# Patient Record
Sex: FEMALE | Race: WHITE | NOT HISPANIC OR LATINO | Employment: OTHER | URBAN - METROPOLITAN AREA
[De-identification: names, ages, dates, MRNs, and addresses within clinical notes are randomized per-mention and may not be internally consistent; named-entity substitution may affect disease eponyms.]

---

## 2017-10-04 DIAGNOSIS — E78.5 HYPERLIPIDEMIA, UNSPECIFIED HYPERLIPIDEMIA TYPE: ICD-10-CM

## 2017-10-04 DIAGNOSIS — E03.9 ADULT HYPOTHYROIDISM: ICD-10-CM

## 2017-10-04 DIAGNOSIS — I10 ESSENTIAL HYPERTENSION: ICD-10-CM

## 2017-10-04 RX ORDER — VALSARTAN AND HYDROCHLOROTHIAZIDE 160; 12.5 MG/1; MG/1
TABLET, FILM COATED ORAL
Qty: 30 TABLET | Refills: 0 | Status: SHIPPED | OUTPATIENT
Start: 2017-10-04 | End: 2017-10-30 | Stop reason: SDUPTHER

## 2017-10-04 RX ORDER — LEVOTHYROXINE SODIUM 88 UG/1
TABLET ORAL
Qty: 30 TABLET | Refills: 0 | Status: SHIPPED | OUTPATIENT
Start: 2017-10-04 | End: 2017-11-02 | Stop reason: SDUPTHER

## 2017-10-04 RX ORDER — ATORVASTATIN CALCIUM 20 MG/1
TABLET, FILM COATED ORAL
Qty: 30 TABLET | Refills: 0 | Status: SHIPPED | OUTPATIENT
Start: 2017-10-04 | End: 2017-11-02 | Stop reason: SDUPTHER

## 2017-10-30 DIAGNOSIS — I10 ESSENTIAL HYPERTENSION: ICD-10-CM

## 2017-10-30 RX ORDER — VALSARTAN AND HYDROCHLOROTHIAZIDE 160; 12.5 MG/1; MG/1
TABLET, FILM COATED ORAL
Qty: 30 TABLET | Refills: 3 | Status: SHIPPED | OUTPATIENT
Start: 2017-10-30 | End: 2017-11-02 | Stop reason: ALTCHOICE

## 2017-11-02 ENCOUNTER — OFFICE VISIT (OUTPATIENT)
Dept: FAMILY MEDICINE CLINIC | Facility: CLINIC | Age: 67
End: 2017-11-02

## 2017-11-02 VITALS
SYSTOLIC BLOOD PRESSURE: 128 MMHG | DIASTOLIC BLOOD PRESSURE: 66 MMHG | HEIGHT: 63 IN | BODY MASS INDEX: 35.08 KG/M2 | OXYGEN SATURATION: 97 % | WEIGHT: 198 LBS | RESPIRATION RATE: 14 BRPM | TEMPERATURE: 98.3 F | HEART RATE: 72 BPM

## 2017-11-02 DIAGNOSIS — E03.9 ADULT HYPOTHYROIDISM: ICD-10-CM

## 2017-11-02 DIAGNOSIS — Z12.39 BREAST CANCER SCREENING: ICD-10-CM

## 2017-11-02 DIAGNOSIS — E78.5 HYPERLIPIDEMIA, UNSPECIFIED HYPERLIPIDEMIA TYPE: Primary | ICD-10-CM

## 2017-11-02 DIAGNOSIS — Z91.89 AT RISK FOR DENTAL PROBLEMS: ICD-10-CM

## 2017-11-02 DIAGNOSIS — I10 ESSENTIAL HYPERTENSION: ICD-10-CM

## 2017-11-02 PROCEDURE — 99214 OFFICE O/P EST MOD 30 MIN: CPT | Performed by: NURSE PRACTITIONER

## 2017-11-02 RX ORDER — CLINDAMYCIN HYDROCHLORIDE 300 MG/1
300 CAPSULE ORAL 3 TIMES DAILY
COMMUNITY
End: 2017-11-02 | Stop reason: SDUPTHER

## 2017-11-02 RX ORDER — LEVOTHYROXINE SODIUM 88 UG/1
88 TABLET ORAL DAILY
Qty: 90 TABLET | Refills: 3 | Status: SHIPPED | OUTPATIENT
Start: 2017-11-02 | End: 2018-10-22 | Stop reason: SDUPTHER

## 2017-11-02 RX ORDER — VALSARTAN 160 MG/1
160 TABLET ORAL DAILY
Qty: 90 TABLET | Refills: 3 | Status: SHIPPED | OUTPATIENT
Start: 2017-11-02 | End: 2018-07-26 | Stop reason: SINTOL

## 2017-11-02 RX ORDER — ATORVASTATIN CALCIUM 20 MG/1
20 TABLET, FILM COATED ORAL DAILY
Qty: 90 TABLET | Refills: 3 | Status: SHIPPED | OUTPATIENT
Start: 2017-11-02 | End: 2018-10-22 | Stop reason: SDUPTHER

## 2017-11-02 RX ORDER — CLINDAMYCIN HYDROCHLORIDE 300 MG/1
300 CAPSULE ORAL 3 TIMES DAILY
Qty: 30 CAPSULE | Refills: 0 | Status: SHIPPED | OUTPATIENT
Start: 2017-11-02 | End: 2022-01-24 | Stop reason: SDUPTHER

## 2017-11-02 NOTE — PROGRESS NOTES
"Subjective   Jessi Ruby is a 67 y.o. female.     History of Present Illness   Jessi Ruyb 67 y.o. female who presents today for routine follow up check and medication refills.  she has a history of   Patient Active Problem List   Diagnosis   • Hyperlipidemia   • Hypertension   • Adult hypothyroidism   • Cannot sleep   • At risk for dental problems   • Breast cancer screening   .  Since the last visit, she has overall felt well.  She has Hypertenision and is well controlled on medication, Hyperlipidemia and is well controlled on medication and hypothyroidism and will need to update labs for continued treatment.  she has been compliant with current medications have reviewed them.  The patient denies medication side effects.      The following portions of the patient's history were reviewed and updated as appropriate: allergies, current medications, past social history and problem list.    Review of Systems   Constitutional: Negative for activity change.   All other systems reviewed and are negative.      Objective   /66 (BP Location: Right arm, Patient Position: Sitting, Cuff Size: Adult)  Pulse 72  Temp 98.3 °F (36.8 °C) (Oral)   Resp 14  Ht 63\" (160 cm)  Wt 198 lb (89.8 kg)  SpO2 97%  BMI 35.07 kg/m2  Physical Exam   Constitutional: She is oriented to person, place, and time. Vital signs are normal. She appears well-developed and well-nourished. No distress.   HENT:   Head: Normocephalic.   Cardiovascular: Normal rate, regular rhythm and normal heart sounds.    Pulmonary/Chest: Effort normal and breath sounds normal.   Neurological: She is alert and oriented to person, place, and time. Gait normal.   Psychiatric: She has a normal mood and affect. Her behavior is normal. Judgment and thought content normal.   Vitals reviewed.      Assessment/Plan   Problem List Items Addressed This Visit        Cardiovascular and Mediastinum    Hyperlipidemia - Primary    Relevant Medications    atorvastatin " (LIPITOR) 20 MG tablet    Other Relevant Orders    Comprehensive Metabolic Panel    Lipid Panel With LDL / HDL Ratio    Hypertension    Relevant Medications    valsartan (DIOVAN) 160 MG tablet    Other Relevant Orders    MicroAlbumin, Urine, Random - Urine, Clean Catch       Endocrine    Adult hypothyroidism    Relevant Medications    levothyroxine (SYNTHROID, LEVOTHROID) 88 MCG tablet    Other Relevant Orders    TSH    T4, Free       Other    At risk for dental problems    Relevant Medications    clindamycin (CLEOCIN) 300 MG capsule    Breast cancer screening    Relevant Orders    Mammo Screening Bilateral With CAD        Follow up after labs  Decrease BP med to just Diovan and monitor BP at home.  Call if stays above 130/90

## 2017-11-03 LAB
ALBUMIN SERPL-MCNC: 4.3 G/DL (ref 3.6–4.8)
ALBUMIN/GLOB SERPL: 1.8 {RATIO} (ref 1.2–2.2)
ALP SERPL-CCNC: 98 IU/L (ref 39–117)
ALT SERPL-CCNC: 24 IU/L (ref 0–32)
AST SERPL-CCNC: 21 IU/L (ref 0–40)
BILIRUB SERPL-MCNC: 0.7 MG/DL (ref 0–1.2)
BUN SERPL-MCNC: 12 MG/DL (ref 8–27)
BUN/CREAT SERPL: 17 (ref 12–28)
CALCIUM SERPL-MCNC: 9.8 MG/DL (ref 8.7–10.3)
CHLORIDE SERPL-SCNC: 100 MMOL/L (ref 96–106)
CHOLEST SERPL-MCNC: 143 MG/DL (ref 100–199)
CO2 SERPL-SCNC: 25 MMOL/L (ref 18–29)
CREAT SERPL-MCNC: 0.69 MG/DL (ref 0.57–1)
GFR SERPLBLD CREATININE-BSD FMLA CKD-EPI: 103 ML/MIN/1.73
GFR SERPLBLD CREATININE-BSD FMLA CKD-EPI: 90 ML/MIN/1.73
GLOBULIN SER CALC-MCNC: 2.4 G/DL (ref 1.5–4.5)
GLUCOSE SERPL-MCNC: 92 MG/DL (ref 65–99)
HDLC SERPL-MCNC: 34 MG/DL
LDLC SERPL CALC-MCNC: 61 MG/DL (ref 0–99)
LDLC/HDLC SERPL: 1.8 RATIO UNITS (ref 0–3.2)
MICROALBUMIN UR-MCNC: <3 UG/ML
POTASSIUM SERPL-SCNC: 4.2 MMOL/L (ref 3.5–5.2)
PROT SERPL-MCNC: 6.7 G/DL (ref 6–8.5)
SODIUM SERPL-SCNC: 141 MMOL/L (ref 134–144)
T4 FREE SERPL-MCNC: 1.31 NG/DL (ref 0.82–1.77)
TRIGL SERPL-MCNC: 241 MG/DL (ref 0–149)
TSH SERPL DL<=0.005 MIU/L-ACNC: 3.85 UIU/ML (ref 0.45–4.5)
VLDLC SERPL CALC-MCNC: 48 MG/DL (ref 5–40)

## 2017-11-09 ENCOUNTER — TRANSCRIBE ORDERS (OUTPATIENT)
Dept: ADMINISTRATIVE | Facility: HOSPITAL | Age: 67
End: 2017-11-09

## 2017-11-09 DIAGNOSIS — Z12.31 VISIT FOR SCREENING MAMMOGRAM: Primary | ICD-10-CM

## 2017-11-17 ENCOUNTER — HOSPITAL ENCOUNTER (OUTPATIENT)
Dept: MAMMOGRAPHY | Facility: HOSPITAL | Age: 67
Discharge: HOME | End: 2017-11-17
Admitting: NURSE PRACTITIONER

## 2017-11-17 DIAGNOSIS — Z12.39 BREAST CANCER SCREENING: ICD-10-CM

## 2017-11-17 PROCEDURE — G0202 SCR MAMMO BI INCL CAD: HCPCS

## 2018-07-26 RX ORDER — LOSARTAN POTASSIUM 50 MG/1
50 TABLET ORAL DAILY
Qty: 90 TABLET | Refills: 3 | Status: SHIPPED | OUTPATIENT
Start: 2018-07-26 | End: 2018-08-20 | Stop reason: DRUGHIGH

## 2018-08-20 ENCOUNTER — DOCUMENTATION (OUTPATIENT)
Dept: FAMILY MEDICINE CLINIC | Facility: CLINIC | Age: 68
End: 2018-08-20

## 2018-08-20 RX ORDER — LOSARTAN POTASSIUM 100 MG/1
100 TABLET ORAL DAILY
Qty: 90 TABLET | Refills: 3 | Status: SHIPPED | OUTPATIENT
Start: 2018-08-20 | End: 2019-10-11 | Stop reason: SDUPTHER

## 2018-10-22 DIAGNOSIS — E03.9 ADULT HYPOTHYROIDISM: ICD-10-CM

## 2018-10-22 DIAGNOSIS — E78.5 HYPERLIPIDEMIA, UNSPECIFIED HYPERLIPIDEMIA TYPE: ICD-10-CM

## 2018-10-22 RX ORDER — LEVOTHYROXINE SODIUM 88 UG/1
88 TABLET ORAL DAILY
Qty: 90 TABLET | Refills: 3 | Status: SHIPPED | OUTPATIENT
Start: 2018-10-22 | End: 2019-10-11 | Stop reason: SDUPTHER

## 2018-10-22 RX ORDER — ATORVASTATIN CALCIUM 20 MG/1
20 TABLET, FILM COATED ORAL DAILY
Qty: 90 TABLET | Refills: 3 | Status: SHIPPED | OUTPATIENT
Start: 2018-10-22 | End: 2019-10-11 | Stop reason: SDUPTHER

## 2019-10-11 DIAGNOSIS — E03.9 ADULT HYPOTHYROIDISM: ICD-10-CM

## 2019-10-11 DIAGNOSIS — E78.5 HYPERLIPIDEMIA, UNSPECIFIED HYPERLIPIDEMIA TYPE: ICD-10-CM

## 2019-10-11 RX ORDER — LEVOTHYROXINE SODIUM 88 UG/1
88 TABLET ORAL DAILY
Qty: 30 TABLET | Refills: 0 | Status: SHIPPED | OUTPATIENT
Start: 2019-10-11 | End: 2019-12-01 | Stop reason: SDUPTHER

## 2019-10-11 RX ORDER — ATORVASTATIN CALCIUM 20 MG/1
20 TABLET, FILM COATED ORAL DAILY
Qty: 30 TABLET | Refills: 0 | Status: SHIPPED | OUTPATIENT
Start: 2019-10-11 | End: 2019-12-01 | Stop reason: SDUPTHER

## 2019-10-11 RX ORDER — LOSARTAN POTASSIUM 100 MG/1
100 TABLET ORAL DAILY
Qty: 30 TABLET | Refills: 0 | Status: SHIPPED | OUTPATIENT
Start: 2019-10-11 | End: 2019-11-05 | Stop reason: SDUPTHER

## 2019-10-17 ENCOUNTER — PROCEDURE VISIT (OUTPATIENT)
Dept: FAMILY MEDICINE CLINIC | Facility: CLINIC | Age: 69
End: 2019-10-17

## 2019-10-17 VITALS
HEIGHT: 63 IN | HEART RATE: 66 BPM | BODY MASS INDEX: 30.65 KG/M2 | WEIGHT: 173 LBS | OXYGEN SATURATION: 99 % | SYSTOLIC BLOOD PRESSURE: 122 MMHG | DIASTOLIC BLOOD PRESSURE: 64 MMHG | TEMPERATURE: 97.8 F

## 2019-10-17 DIAGNOSIS — E78.5 HYPERLIPIDEMIA, UNSPECIFIED HYPERLIPIDEMIA TYPE: ICD-10-CM

## 2019-10-17 DIAGNOSIS — Z12.39 BREAST CANCER SCREENING: ICD-10-CM

## 2019-10-17 DIAGNOSIS — Z13.0 SCREENING FOR IRON DEFICIENCY ANEMIA: ICD-10-CM

## 2019-10-17 DIAGNOSIS — Z01.419 PAP TEST, AS PART OF ROUTINE GYNECOLOGICAL EXAMINATION: Primary | ICD-10-CM

## 2019-10-17 DIAGNOSIS — E03.9 ADULT HYPOTHYROIDISM: ICD-10-CM

## 2019-10-17 DIAGNOSIS — Z13.1 SCREENING FOR DIABETES MELLITUS: ICD-10-CM

## 2019-10-17 DIAGNOSIS — Z12.31 ENCOUNTER FOR SCREENING MAMMOGRAM FOR MALIGNANT NEOPLASM OF BREAST: ICD-10-CM

## 2019-10-17 PROCEDURE — G0101 CA SCREEN;PELVIC/BREAST EXAM: HCPCS | Performed by: NURSE PRACTITIONER

## 2019-10-17 NOTE — PROGRESS NOTES
"Subjective   Jessi Ruby is a 69 y.o. female.     History of Present Illness   Well Adult Physical: Patient here for a comprehensive physical exam.The patient reports no problems  Do you take any herbs or supplements that were not prescribed by a doctor? no Are you taking calcium supplements? no Are you taking aspirin daily? no      The following portions of the patient's history were reviewed and updated as appropriate: allergies, current medications, past social history and problem list.    Review of Systems   Constitutional: Negative for fever.   Respiratory: Negative for cough and shortness of breath.    Cardiovascular: Negative for chest pain.   Gastrointestinal: Negative for abdominal pain.   Neurological: Negative for dizziness.       Objective   /64 (BP Location: Left arm, Patient Position: Sitting)   Pulse 66   Temp 97.8 °F (36.6 °C)   Ht 160 cm (63\")   Wt 78.5 kg (173 lb)   SpO2 99%   BMI 30.65 kg/m²   Physical Exam   Constitutional: She is oriented to person, place, and time. She appears well-developed and well-nourished.   Neck: No thyromegaly present.   Cardiovascular: Normal rate, regular rhythm and normal heart sounds. Exam reveals no gallop.   No murmur heard.  Pulmonary/Chest: Effort normal and breath sounds normal. She has no wheezes. She has no rales. She exhibits no tenderness. Right breast exhibits no mass, no nipple discharge and no skin change. Left breast exhibits no mass, no nipple discharge and no skin change.   Abdominal: There is no tenderness.   Genitourinary: Vagina normal and uterus normal. Pelvic exam was performed with patient supine. There is no rash or lesion on the right labia. There is no rash or lesion on the left labia. Uterus is not enlarged and not tender. Cervix exhibits no motion tenderness, no discharge and no friability. Right adnexum displays no mass, no tenderness and no fullness. Left adnexum displays no mass, no tenderness and no fullness. No erythema, " tenderness or bleeding in the vagina. No vaginal discharge found.   Lymphadenopathy:        Right: No inguinal adenopathy present.        Left: No inguinal adenopathy present.   Neurological: She is alert and oriented to person, place, and time.   Skin: Skin is warm. No rash noted.   Psychiatric: She has a normal mood and affect. Her behavior is normal. Judgment and thought content normal.   Vitals reviewed.      Assessment/Plan      Diagnosis Plan   1. Pap test, as part of routine gynecological examination     2. Hyperlipidemia, unspecified hyperlipidemia type  Comprehensive Metabolic Panel    Lipid Panel With LDL / HDL Ratio   3. Adult hypothyroidism  TSH   4. Screening for iron deficiency anemia  CBC & Differential   5. Screening for diabetes mellitus  Comprehensive Metabolic Panel   6. Breast cancer screening  Mammo Screening Bilateral With CAD   7. Encounter for screening mammogram for malignant neoplasm of breast   Mammo Screening Bilateral With CAD     Discussed weight, diet and exercise  Follow up after labs      VY Cole  10/17/2019

## 2019-10-18 LAB
ALBUMIN SERPL-MCNC: 4.6 G/DL (ref 3.5–5.2)
ALBUMIN/GLOB SERPL: 2 G/DL
ALP SERPL-CCNC: 75 U/L (ref 39–117)
ALT SERPL-CCNC: 15 U/L (ref 1–33)
AST SERPL-CCNC: 16 U/L (ref 1–32)
BASOPHILS # BLD AUTO: 0.06 10*3/MM3 (ref 0–0.2)
BASOPHILS NFR BLD AUTO: 1 % (ref 0–1.5)
BILIRUB SERPL-MCNC: 0.8 MG/DL (ref 0.2–1.2)
BUN SERPL-MCNC: 11 MG/DL (ref 8–23)
BUN/CREAT SERPL: 15.9 (ref 7–25)
CALCIUM SERPL-MCNC: 9.5 MG/DL (ref 8.6–10.5)
CHLORIDE SERPL-SCNC: 103 MMOL/L (ref 98–107)
CHOLEST SERPL-MCNC: 158 MG/DL (ref 0–200)
CO2 SERPL-SCNC: 28.2 MMOL/L (ref 22–29)
CREAT SERPL-MCNC: 0.69 MG/DL (ref 0.57–1)
EOSINOPHIL # BLD AUTO: 0.17 10*3/MM3 (ref 0–0.4)
EOSINOPHIL NFR BLD AUTO: 2.9 % (ref 0.3–6.2)
ERYTHROCYTE [DISTWIDTH] IN BLOOD BY AUTOMATED COUNT: 12.2 % (ref 12.3–15.4)
GLOBULIN SER CALC-MCNC: 2.3 GM/DL
GLUCOSE SERPL-MCNC: 95 MG/DL (ref 65–99)
HCT VFR BLD AUTO: 41.6 % (ref 34–46.6)
HDLC SERPL-MCNC: 43 MG/DL (ref 40–60)
HGB BLD-MCNC: 14.2 G/DL (ref 12–15.9)
IMM GRANULOCYTES # BLD AUTO: 0.01 10*3/MM3 (ref 0–0.05)
IMM GRANULOCYTES NFR BLD AUTO: 0.2 % (ref 0–0.5)
LDLC SERPL CALC-MCNC: 77 MG/DL (ref 0–100)
LDLC/HDLC SERPL: 1.79 {RATIO}
LYMPHOCYTES # BLD AUTO: 1.51 10*3/MM3 (ref 0.7–3.1)
LYMPHOCYTES NFR BLD AUTO: 25.4 % (ref 19.6–45.3)
MCH RBC QN AUTO: 32.8 PG (ref 26.6–33)
MCHC RBC AUTO-ENTMCNC: 34.1 G/DL (ref 31.5–35.7)
MCV RBC AUTO: 96.1 FL (ref 79–97)
MONOCYTES # BLD AUTO: 0.47 10*3/MM3 (ref 0.1–0.9)
MONOCYTES NFR BLD AUTO: 7.9 % (ref 5–12)
NEUTROPHILS # BLD AUTO: 3.73 10*3/MM3 (ref 1.7–7)
NEUTROPHILS NFR BLD AUTO: 62.6 % (ref 42.7–76)
NRBC BLD AUTO-RTO: 0 /100 WBC (ref 0–0.2)
PLATELET # BLD AUTO: 205 10*3/MM3 (ref 140–450)
POTASSIUM SERPL-SCNC: 3.9 MMOL/L (ref 3.5–5.2)
PROT SERPL-MCNC: 6.9 G/DL (ref 6–8.5)
RBC # BLD AUTO: 4.33 10*6/MM3 (ref 3.77–5.28)
SODIUM SERPL-SCNC: 141 MMOL/L (ref 136–145)
TRIGL SERPL-MCNC: 190 MG/DL (ref 0–150)
TSH SERPL DL<=0.005 MIU/L-ACNC: 3.18 UIU/ML (ref 0.27–4.2)
VLDLC SERPL CALC-MCNC: 38 MG/DL
WBC # BLD AUTO: 5.95 10*3/MM3 (ref 3.4–10.8)

## 2019-10-21 LAB
CYTOLOGIST CVX/VAG CYTO: NORMAL
CYTOLOGY CVX/VAG DOC CYTO: NORMAL
CYTOLOGY CVX/VAG DOC THIN PREP: NORMAL
DX ICD CODE: NORMAL
HIV 1 & 2 AB SER-IMP: NORMAL
HPV I/H RISK 1 DNA CVX QL PROBE+SIG AMP: NORMAL
HPV I/H RISK 4 DNA CVX QL PROBE+SIG AMP: NEGATIVE
OTHER STN SPEC: NORMAL
STAT OF ADQ CVX/VAG CYTO-IMP: NORMAL

## 2019-11-05 RX ORDER — LOSARTAN POTASSIUM 100 MG/1
TABLET ORAL
Qty: 30 TABLET | Refills: 11 | Status: SHIPPED | OUTPATIENT
Start: 2019-11-05 | End: 2019-11-07 | Stop reason: SDUPTHER

## 2019-11-07 RX ORDER — LOSARTAN POTASSIUM 100 MG/1
100 TABLET ORAL DAILY
Qty: 30 TABLET | Refills: 11 | Status: SHIPPED | OUTPATIENT
Start: 2019-11-07 | End: 2020-01-14 | Stop reason: SDUPTHER

## 2019-11-13 ENCOUNTER — HOSPITAL ENCOUNTER (OUTPATIENT)
Dept: MAMMOGRAPHY | Facility: HOSPITAL | Age: 69
Discharge: HOME | End: 2019-11-13
Admitting: NURSE PRACTITIONER

## 2019-11-13 PROCEDURE — 77067 SCR MAMMO BI INCL CAD: CPT

## 2019-11-13 PROCEDURE — 77063 BREAST TOMOSYNTHESIS BI: CPT

## 2019-12-01 DIAGNOSIS — E03.9 ADULT HYPOTHYROIDISM: ICD-10-CM

## 2019-12-01 DIAGNOSIS — E78.5 HYPERLIPIDEMIA, UNSPECIFIED HYPERLIPIDEMIA TYPE: ICD-10-CM

## 2019-12-02 RX ORDER — ATORVASTATIN CALCIUM 20 MG/1
TABLET, FILM COATED ORAL
Qty: 30 TABLET | Refills: 0 | Status: SHIPPED | OUTPATIENT
Start: 2019-12-02 | End: 2020-01-13

## 2019-12-02 RX ORDER — LEVOTHYROXINE SODIUM 88 UG/1
TABLET ORAL
Qty: 30 TABLET | Refills: 0 | Status: SHIPPED | OUTPATIENT
Start: 2019-12-02 | End: 2020-01-14 | Stop reason: SDUPTHER

## 2020-01-11 DIAGNOSIS — E78.5 HYPERLIPIDEMIA, UNSPECIFIED HYPERLIPIDEMIA TYPE: ICD-10-CM

## 2020-01-13 RX ORDER — ATORVASTATIN CALCIUM 20 MG/1
TABLET, FILM COATED ORAL
Qty: 30 TABLET | Refills: 0 | Status: SHIPPED | OUTPATIENT
Start: 2020-01-13 | End: 2020-01-14 | Stop reason: SDUPTHER

## 2020-01-14 DIAGNOSIS — E03.9 ADULT HYPOTHYROIDISM: ICD-10-CM

## 2020-01-14 DIAGNOSIS — E78.5 HYPERLIPIDEMIA, UNSPECIFIED HYPERLIPIDEMIA TYPE: ICD-10-CM

## 2020-01-14 RX ORDER — ATORVASTATIN CALCIUM 20 MG/1
20 TABLET, FILM COATED ORAL DAILY
Qty: 90 TABLET | Refills: 3 | Status: SHIPPED | OUTPATIENT
Start: 2020-01-14 | End: 2020-11-09 | Stop reason: SDUPTHER

## 2020-01-14 RX ORDER — LEVOTHYROXINE SODIUM 88 UG/1
88 TABLET ORAL DAILY
Qty: 90 TABLET | Refills: 3 | Status: SHIPPED | OUTPATIENT
Start: 2020-01-14 | End: 2020-11-09 | Stop reason: SDUPTHER

## 2020-01-14 RX ORDER — LOSARTAN POTASSIUM 100 MG/1
100 TABLET ORAL DAILY
Qty: 90 TABLET | Refills: 3 | Status: SHIPPED | OUTPATIENT
Start: 2020-01-14 | End: 2020-11-09 | Stop reason: SDUPTHER

## 2020-02-10 ENCOUNTER — TELEPHONE (OUTPATIENT)
Dept: FAMILY MEDICINE CLINIC | Facility: CLINIC | Age: 70
End: 2020-02-10

## 2020-02-10 RX ORDER — OSELTAMIVIR PHOSPHATE 75 MG/1
75 CAPSULE ORAL 2 TIMES DAILY
Qty: 10 CAPSULE | Refills: 0 | Status: SHIPPED | OUTPATIENT
Start: 2020-02-10 | End: 2020-11-09

## 2020-02-10 NOTE — TELEPHONE ENCOUNTER
I would be willing to write for Tamiflu, there are lots of side effects including nausea and vomiting.  Please send in Tamiflu 75 mg twice daily, 5 days, 10 and tablets.      She does need to be aware that it can be very hard on her stomach and if she gets any side effects to stop it.  Otherwise supportive care.

## 2020-02-10 NOTE — TELEPHONE ENCOUNTER
Patient is a janis patient she left message stating her grand daughter who lives with her was diagnosed with the flu and was told if she started running a fever to ask her primary care for tamiflu patient said she started running a fever yesterday would you be willing to prescribe tamiflu?

## 2020-11-09 ENCOUNTER — OFFICE VISIT (OUTPATIENT)
Dept: FAMILY MEDICINE CLINIC | Facility: CLINIC | Age: 70
End: 2020-11-09

## 2020-11-09 VITALS
HEIGHT: 63 IN | HEART RATE: 82 BPM | TEMPERATURE: 97.5 F | BODY MASS INDEX: 31.01 KG/M2 | SYSTOLIC BLOOD PRESSURE: 130 MMHG | WEIGHT: 175 LBS | DIASTOLIC BLOOD PRESSURE: 62 MMHG | OXYGEN SATURATION: 98 %

## 2020-11-09 DIAGNOSIS — Z13.0 SCREENING FOR IRON DEFICIENCY ANEMIA: ICD-10-CM

## 2020-11-09 DIAGNOSIS — E03.9 ADULT HYPOTHYROIDISM: ICD-10-CM

## 2020-11-09 DIAGNOSIS — E78.5 HYPERLIPIDEMIA, UNSPECIFIED HYPERLIPIDEMIA TYPE: ICD-10-CM

## 2020-11-09 DIAGNOSIS — M25.511 ACUTE PAIN OF RIGHT SHOULDER: Primary | ICD-10-CM

## 2020-11-09 DIAGNOSIS — Z13.1 SCREENING FOR DIABETES MELLITUS: ICD-10-CM

## 2020-11-09 PROCEDURE — 99214 OFFICE O/P EST MOD 30 MIN: CPT | Performed by: NURSE PRACTITIONER

## 2020-11-09 PROCEDURE — 73030 X-RAY EXAM OF SHOULDER: CPT | Performed by: NURSE PRACTITIONER

## 2020-11-09 RX ORDER — LOSARTAN POTASSIUM 100 MG/1
100 TABLET ORAL DAILY
Qty: 90 TABLET | Refills: 3 | Status: SHIPPED | OUTPATIENT
Start: 2020-11-09 | End: 2022-01-24 | Stop reason: SDUPTHER

## 2020-11-09 RX ORDER — ATORVASTATIN CALCIUM 20 MG/1
20 TABLET, FILM COATED ORAL DAILY
Qty: 90 TABLET | Refills: 3 | Status: SHIPPED | OUTPATIENT
Start: 2020-11-09 | End: 2020-11-11

## 2020-11-09 RX ORDER — LEVOTHYROXINE SODIUM 88 UG/1
88 TABLET ORAL DAILY
Qty: 90 TABLET | Refills: 3 | Status: SHIPPED | OUTPATIENT
Start: 2020-11-09 | End: 2020-11-13

## 2020-11-09 NOTE — PROGRESS NOTES
"Subjective   Jessi Ruby is a 70 y.o. female.   Chief Complaint   Patient presents with   • Shoulder Pain     Patient fell on Saturday and injuryed her right shoulder ( wore mask and goggles)        History of Present Illness   Shoulder Pain  Patient complains of right shoulder pain. The symptoms began 2 days ago.  Pain is a result of fell at home and catch herself with her right arm.. Pain is located anterior. region. Discomfort is described as aching. She cannor rise her arm since fall.  Evaluation to date: none. Therapy to date includes: rest, ice and OTC analgesics which are somewhat effective.    The following portions of the patient's history were reviewed and updated as appropriate: allergies, current medications, past social history and problem list.    Review of Systems   Musculoskeletal: Positive for arthralgias.   All other systems reviewed and are negative.      Objective   /62   Pulse 82   Temp 97.5 °F (36.4 °C)   Ht 160 cm (63\")   Wt 79.4 kg (175 lb)   SpO2 98%   BMI 31.00 kg/m²   Physical Exam  Vitals signs reviewed.   Constitutional:       General: She is not in acute distress.     Appearance: She is well-developed.   HENT:      Head: Normocephalic.   Cardiovascular:      Rate and Rhythm: Normal rate and regular rhythm.      Heart sounds: Normal heart sounds.   Pulmonary:      Effort: Pulmonary effort is normal.      Breath sounds: Normal breath sounds.   Neurological:      Mental Status: She is alert and oriented to person, place, and time.      Gait: Gait normal.   Psychiatric:         Behavior: Behavior normal.         Thought Content: Thought content normal.         Judgment: Judgment normal.       Xray- right shoulder    Findings- questioning rotator cuff tear   No comparison      Assessment/Plan      Diagnosis Plan   1. Acute pain of right shoulder  XR Shoulder 2+ View Right    MRI Shoulder Right Without Contrast    Ambulatory Referral to Orthopedic Surgery   2. Hyperlipidemia, " unspecified hyperlipidemia type  Comprehensive Metabolic Panel    Lipid Panel With LDL / HDL Ratio   3. Screening for diabetes mellitus  Comprehensive Metabolic Panel   4. Screening for iron deficiency anemia  CBC & Differential     Follow up after labs  Referral  to ortho and MRI     Mask and googles worn    Andrez Chaney, APRN  11/9/2020

## 2020-11-10 DIAGNOSIS — E03.9 ADULT HYPOTHYROIDISM: Primary | ICD-10-CM

## 2020-11-10 LAB
ALBUMIN SERPL-MCNC: 4.6 G/DL (ref 3.5–5.2)
ALBUMIN/GLOB SERPL: 2.1 G/DL
ALP SERPL-CCNC: 95 U/L (ref 39–117)
ALT SERPL-CCNC: 22 U/L (ref 1–33)
AST SERPL-CCNC: 22 U/L (ref 1–32)
BASOPHILS # BLD AUTO: 0.07 10*3/MM3 (ref 0–0.2)
BASOPHILS NFR BLD AUTO: 0.8 % (ref 0–1.5)
BILIRUB SERPL-MCNC: 1.1 MG/DL (ref 0–1.2)
BUN SERPL-MCNC: 16 MG/DL (ref 8–23)
BUN/CREAT SERPL: 23.9 (ref 7–25)
CALCIUM SERPL-MCNC: 9.6 MG/DL (ref 8.6–10.5)
CHLORIDE SERPL-SCNC: 103 MMOL/L (ref 98–107)
CHOLEST SERPL-MCNC: 206 MG/DL (ref 0–200)
CO2 SERPL-SCNC: 27.1 MMOL/L (ref 22–29)
CREAT SERPL-MCNC: 0.67 MG/DL (ref 0.57–1)
EOSINOPHIL # BLD AUTO: 0.06 10*3/MM3 (ref 0–0.4)
EOSINOPHIL NFR BLD AUTO: 0.7 % (ref 0.3–6.2)
ERYTHROCYTE [DISTWIDTH] IN BLOOD BY AUTOMATED COUNT: 12 % (ref 12.3–15.4)
GLOBULIN SER CALC-MCNC: 2.2 GM/DL
GLUCOSE SERPL-MCNC: 98 MG/DL (ref 65–99)
HCT VFR BLD AUTO: 43.2 % (ref 34–46.6)
HDLC SERPL-MCNC: 60 MG/DL (ref 40–60)
HGB BLD-MCNC: 14.4 G/DL (ref 12–15.9)
IMM GRANULOCYTES # BLD AUTO: 0.04 10*3/MM3 (ref 0–0.05)
IMM GRANULOCYTES NFR BLD AUTO: 0.5 % (ref 0–0.5)
LDLC SERPL CALC-MCNC: 107 MG/DL (ref 0–100)
LDLC/HDLC SERPL: 1.66 {RATIO}
LYMPHOCYTES # BLD AUTO: 1.5 10*3/MM3 (ref 0.7–3.1)
LYMPHOCYTES NFR BLD AUTO: 17.4 % (ref 19.6–45.3)
Lab: NORMAL
Lab: NORMAL
MCH RBC QN AUTO: 32.9 PG (ref 26.6–33)
MCHC RBC AUTO-ENTMCNC: 33.3 G/DL (ref 31.5–35.7)
MCV RBC AUTO: 98.6 FL (ref 79–97)
MONOCYTES # BLD AUTO: 0.75 10*3/MM3 (ref 0.1–0.9)
MONOCYTES NFR BLD AUTO: 8.7 % (ref 5–12)
NEUTROPHILS # BLD AUTO: 6.21 10*3/MM3 (ref 1.7–7)
NEUTROPHILS NFR BLD AUTO: 71.9 % (ref 42.7–76)
NRBC BLD AUTO-RTO: 0 /100 WBC (ref 0–0.2)
PLATELET # BLD AUTO: 209 10*3/MM3 (ref 140–450)
POTASSIUM SERPL-SCNC: 4.3 MMOL/L (ref 3.5–5.2)
PROT SERPL-MCNC: 6.8 G/DL (ref 6–8.5)
RBC # BLD AUTO: 4.38 10*6/MM3 (ref 3.77–5.28)
SODIUM SERPL-SCNC: 137 MMOL/L (ref 136–145)
TRIGL SERPL-MCNC: 231 MG/DL (ref 0–150)
VLDLC SERPL CALC-MCNC: 39 MG/DL (ref 5–40)
WBC # BLD AUTO: 8.63 10*3/MM3 (ref 3.4–10.8)

## 2020-11-12 LAB
TSH SERPL DL<=0.005 MIU/L-ACNC: 5.94 UIU/ML (ref 0.27–4.2)
WRITTEN AUTHORIZATION: NORMAL

## 2020-11-12 RX ORDER — ATORVASTATIN CALCIUM 40 MG/1
40 TABLET, FILM COATED ORAL DAILY
Qty: 90 TABLET | Refills: 3 | Status: SHIPPED | OUTPATIENT
Start: 2020-11-12 | End: 2021-11-03

## 2020-11-13 RX ORDER — LEVOTHYROXINE SODIUM 0.1 MG/1
100 TABLET ORAL DAILY
Qty: 30 TABLET | Refills: 2 | Status: SHIPPED | OUTPATIENT
Start: 2020-11-13 | End: 2021-02-01

## 2020-11-27 ENCOUNTER — HOSPITAL ENCOUNTER (OUTPATIENT)
Dept: MRI IMAGING | Facility: HOSPITAL | Age: 70
Discharge: HOME | End: 2020-11-27
Admitting: NURSE PRACTITIONER

## 2020-11-27 PROCEDURE — 73221 MRI JOINT UPR EXTREM W/O DYE: CPT

## 2020-12-22 LAB — TSH SERPL DL<=0.005 MIU/L-ACNC: 1.76 UIU/ML (ref 0.27–4.2)

## 2020-12-23 ENCOUNTER — OFFICE VISIT (OUTPATIENT)
Dept: ORTHOPEDIC SURGERY | Facility: CLINIC | Age: 70
End: 2020-12-23

## 2020-12-23 VITALS — TEMPERATURE: 97.3 F | BODY MASS INDEX: 31.01 KG/M2 | HEIGHT: 63 IN | WEIGHT: 175 LBS

## 2020-12-23 DIAGNOSIS — M25.511 RIGHT SHOULDER PAIN, UNSPECIFIED CHRONICITY: Primary | ICD-10-CM

## 2020-12-23 PROCEDURE — 99203 OFFICE O/P NEW LOW 30 MIN: CPT | Performed by: ORTHOPAEDIC SURGERY

## 2020-12-23 PROCEDURE — 73030 X-RAY EXAM OF SHOULDER: CPT | Performed by: ORTHOPAEDIC SURGERY

## 2020-12-23 NOTE — PROGRESS NOTES
Patient: Jessi Ruby    YOB: 1950    Medical Record Number: [id number]    Chief Complaints:  Right shoulder injury    History of Present Illness:     70 y.o. female patient who presents with a complaint of right shoulder pain.  She reports that the symptoms first started in November.  She fell when she caught her shoe going into her basement.  She landed awkwardly and try to catch herself with the right side.  She felt a sharp pain in the right shoulder at the time.  For several days afterwards, she could hardly raise the arm at all without the assistance of her left.  Her motion has gotten better but she continues to have weakness in the arm when trying to reach or lift overhead.  Her current pain is mild, 2 out of 10 in severity.  She describes her typical pain as constant and aching.  Her biggest complaint is the weakness when trying to reach or lift overhead.  She denies any alleviating factors other than rest.  She denies any shooting pain down the arm, distal weakness, numbness or paresthesias.     Allergies:   Allergies   Allergen Reactions   • Penicillins        Home Medications:    Current Outpatient Medications:   •  atorvastatin (Lipitor) 40 MG tablet, Take 1 tablet by mouth Daily., Disp: 90 tablet, Rfl: 3  •  clindamycin (CLEOCIN) 300 MG capsule, Take 1 capsule by mouth 3 (Three) Times a Day. As needed when has a dental appt, Disp: 30 capsule, Rfl: 0  •  levothyroxine (Synthroid) 100 MCG tablet, Take 1 tablet by mouth Daily., Disp: 30 tablet, Rfl: 2  •  losartan (COZAAR) 100 MG tablet, Take 1 tablet by mouth Daily., Disp: 90 tablet, Rfl: 3    Past Medical History:   Diagnosis Date   • Hyperlipidemia    • Hypertension    • Hyperthyroidism        Past Surgical History:   Procedure Laterality Date   • D&C AND LAPAROSCOPY     • KNEE SURGERY     • LASER ABLATION         Social History     Occupational History   • Not on file   Tobacco Use   • Smoking status: Current Some Day Smoker   •  "Smokeless tobacco: Never Used   Substance and Sexual Activity   • Alcohol use: Yes   • Drug use: Not on file   • Sexual activity: Not on file      Social History     Social History Narrative   • Not on file   She is right-hand dominant.    Family History   Problem Relation Age of Onset   • Alzheimer's disease Mother    • Cancer Father    • Drug abuse Daughter      Review of Systems:      Constitutional: Denies fever, shaking or chills   Eyes: Denies change in visual acuity   HEENT: Denies nasal congestion or sore throat   Respiratory: Denies cough or shortness of breath   Cardiovascular: Denies chest pain or edema  Endocrine: Denies tremors, palpitations, intolerance of heat or cold, polyuria, polydipsia.  GI: Denies abdominal pain, nausea, vomiting, bloody stools or diarrhea  : Denies frequency, urgency, incontinence, retention, or nocturia.  Musculoskeletal: Denies numbness, tingling or loss of motor function except as above  Integument: Denies rash, lesion or ulceration   Neurologic: Denies headache or focal weakness, deficits  Heme: Denies spontaneous or excessive bleeding, epistaxis, hematuria, melena, fatigue, enlarged or tender lymph nodes.      All other pertinent positives and negatives as noted above in HPI.    Physical Exam:   70 y.o. female  Vitals:    12/23/20 1016   Temp: 97.3 °F (36.3 °C)   Weight: 79.4 kg (175 lb)   Height: 160 cm (63\")     General:  Patient is awake and alert.  Appears in no acute distress or discomfort.    Psych:  Affect and demeanor are appropriate.    Eyes:  Conjunctiva and sclera appear grossly normal.  Eyes track well and EOM seem to be intact.    Ears:  No gross abnormalities.  Hearing adequate for the exam.    Cardiovascular:  Regular rate and rhythm.    Lungs:  Good chest expansion.  Breathing unlabored.    Spine:  Neck appears grossly normal.  No palpable masses or adenopathy.  Good motion.  Spurling's maneuver is negative for any shoulder or arm symptoms.    Extremities: "  Right shoulder is examined.  Skin is benign.  No obvious gross abnormalities.  No palpable masses or adenopathy.  Moderate tenderness noted over anterior glenohumeral joint and rotator interval without an effusion.  Her motion is full relative to the contralateral side.  No instability.  4 out of 5 strength with resistive testing of elevation in the scapular plane and external rotation.  Negative external rotation lag sign.  Good strength with internal rotation.  Her deltoid fires on exam.  Normal axillary nerve sensation.  Good motor function in the lower arm and hand including wrist flexion, extension,  and pinch.  Intact sensation.  Palpable radial pulse.  Brisk capillary refill.  Good skin turgor.         Radiology:  AP, scapular Y, and axillary views of the right shoulder are ordered by myself and reviewed to evaluate the patient's complaint.  No comparison films are immediately available.  The x-rays show mild glenohumeral degenerative changes with osteophyte formation and subchondral sclerosis.  Acromiohumeral interval measures normal.  She appears to have a significant subacromial spur and calcification of the coracoacromial ligament.    MRI of the right shoulder is reviewed along with the associated report.  Findings are listed below.    IMPRESSION:  1. Complete supraspinatus and infraspinatus tendon insertional tears  with retraction and muscular edema.  2. Moderate AC joint arthritis with acromial spur.  3. Mild glenohumeral joint arthritis with degenerative tear  posterior-superior labrum and mild subchondral marrow edema in the  superior glenoid. Glenohumeral joint effusion. Tendinopathy  intra-articular segment long head biceps tendon.    Assessment/Plan:  Right shoulder chronic irreparable rotator cuff tear     She almost certainly has a component of an acute on chronic tear.  She has significant atrophy and retraction.  I told her that I do not consider this tear is likely repairable.  From a  surgical standpoint, she would be looking at an arthroplasty.  I recommend a trial of conservative treatment.  We discussed treatment options in detail including the risks, benefits, and alternatives of conservative treatment versus surgical options.  Regarding conservative treatment, we discussed appropriate activity modifications, anti-inflammatories, injections, and physical therapy.      She acknowledged understanding of the information and elected for a trial of PT.  She says her pain is not bad enough to justify an injection at this point.  Going forward, she will follow-up with me on an as-needed basis.    Hemanth Alejandro MD    12/23/2020    CC to Andrez Chaney APRN

## 2021-02-01 RX ORDER — LEVOTHYROXINE SODIUM 0.1 MG/1
TABLET ORAL
Qty: 30 TABLET | Refills: 0 | Status: SHIPPED | OUTPATIENT
Start: 2021-02-01 | End: 2021-02-26

## 2021-02-26 RX ORDER — LEVOTHYROXINE SODIUM 0.1 MG/1
TABLET ORAL
Qty: 90 TABLET | Refills: 1 | Status: SHIPPED | OUTPATIENT
Start: 2021-02-26 | End: 2021-08-23

## 2021-03-09 DIAGNOSIS — Z23 IMMUNIZATION DUE: ICD-10-CM

## 2021-08-23 RX ORDER — LEVOTHYROXINE SODIUM 0.1 MG/1
TABLET ORAL
Qty: 90 TABLET | Refills: 3 | Status: SHIPPED | OUTPATIENT
Start: 2021-08-23 | End: 2022-03-29 | Stop reason: SDUPTHER

## 2021-11-03 RX ORDER — ATORVASTATIN CALCIUM 40 MG/1
TABLET, FILM COATED ORAL
Qty: 90 TABLET | Refills: 3 | Status: SHIPPED | OUTPATIENT
Start: 2021-11-03 | End: 2022-01-24 | Stop reason: SDUPTHER

## 2021-12-27 ENCOUNTER — TELEPHONE (OUTPATIENT)
Dept: FAMILY MEDICINE CLINIC | Facility: CLINIC | Age: 71
End: 2021-12-27

## 2021-12-27 NOTE — TELEPHONE ENCOUNTER
Refill with Andrez Chaney APRN (12/26/2021)    Called pt to let her know she needed an appointment.  Could not reach pt.  Left VM for her to call back.;

## 2022-01-24 ENCOUNTER — OFFICE VISIT (OUTPATIENT)
Dept: FAMILY MEDICINE CLINIC | Facility: CLINIC | Age: 72
End: 2022-01-24

## 2022-01-24 VITALS
WEIGHT: 180.6 LBS | HEART RATE: 78 BPM | OXYGEN SATURATION: 98 % | BODY MASS INDEX: 32 KG/M2 | SYSTOLIC BLOOD PRESSURE: 140 MMHG | DIASTOLIC BLOOD PRESSURE: 70 MMHG | HEIGHT: 63 IN | TEMPERATURE: 97.3 F

## 2022-01-24 DIAGNOSIS — R53.83 FATIGUE, UNSPECIFIED TYPE: ICD-10-CM

## 2022-01-24 DIAGNOSIS — E03.9 ADULT HYPOTHYROIDISM: ICD-10-CM

## 2022-01-24 DIAGNOSIS — Z13.1 SCREENING FOR DIABETES MELLITUS: ICD-10-CM

## 2022-01-24 DIAGNOSIS — Z12.11 COLON CANCER SCREENING: ICD-10-CM

## 2022-01-24 DIAGNOSIS — I10 PRIMARY HYPERTENSION: ICD-10-CM

## 2022-01-24 DIAGNOSIS — Z91.89 AT RISK FOR DENTAL PROBLEMS: ICD-10-CM

## 2022-01-24 DIAGNOSIS — Z13.0 SCREENING FOR IRON DEFICIENCY ANEMIA: ICD-10-CM

## 2022-01-24 DIAGNOSIS — Z12.31 ENCOUNTER FOR SCREENING MAMMOGRAM FOR MALIGNANT NEOPLASM OF BREAST: ICD-10-CM

## 2022-01-24 DIAGNOSIS — Z23 NEED FOR VACCINATION: ICD-10-CM

## 2022-01-24 DIAGNOSIS — E78.5 HYPERLIPIDEMIA, UNSPECIFIED HYPERLIPIDEMIA TYPE: Primary | ICD-10-CM

## 2022-01-24 PROCEDURE — 99214 OFFICE O/P EST MOD 30 MIN: CPT | Performed by: NURSE PRACTITIONER

## 2022-01-24 PROCEDURE — G0009 ADMIN PNEUMOCOCCAL VACCINE: HCPCS | Performed by: NURSE PRACTITIONER

## 2022-01-24 PROCEDURE — 90670 PCV13 VACCINE IM: CPT | Performed by: NURSE PRACTITIONER

## 2022-01-24 RX ORDER — LOSARTAN POTASSIUM 100 MG/1
100 TABLET ORAL DAILY
Qty: 90 TABLET | Refills: 3 | Status: SHIPPED | OUTPATIENT
Start: 2022-01-24 | End: 2022-01-31 | Stop reason: SDUPTHER

## 2022-01-24 RX ORDER — ATORVASTATIN CALCIUM 40 MG/1
40 TABLET, FILM COATED ORAL DAILY
Qty: 90 TABLET | Refills: 3 | Status: SHIPPED | OUTPATIENT
Start: 2022-01-24 | End: 2022-01-31 | Stop reason: SDUPTHER

## 2022-01-24 RX ORDER — CLINDAMYCIN HYDROCHLORIDE 300 MG/1
CAPSULE ORAL
Qty: 10 CAPSULE | Refills: 0 | Status: SHIPPED | OUTPATIENT
Start: 2022-01-24 | End: 2022-01-31 | Stop reason: SDUPTHER

## 2022-01-24 NOTE — PROGRESS NOTES
"Chief Complaint  Hypertension (medication refill) and Hyperlipidemia    Subjective          Jessi Ruby presents to [facility]  History of Present Illness  Hyperlipidemia-patient is currently on atorvastatin 40 mg daily as directed without any side effects.  Cholesterol was last checked in November 2020 with a total cholesterol of 206 and LDL of 107.    Hypertension-blood pressures well controlled in the office today.  Currently on losartan 100 mg daily as directed without any side effects.    Hypothyroidism-patient is currently on levothyroxine 100 mcg daily as directed by side effects not currently experiencing any symptoms of hypothyroidism at this time.  TSH was last checked in December 2020 with a result of 1.76.    Objective   Vital Signs:   /70   Pulse 78   Temp 97.3 °F (36.3 °C)   Ht 160 cm (63\")   Wt 81.9 kg (180 lb 9.6 oz)   SpO2 98%   BMI 31.99 kg/m²     Physical Exam  Vitals reviewed.   Constitutional:       General: She is not in acute distress.     Appearance: She is well-developed.   HENT:      Head: Normocephalic.   Cardiovascular:      Rate and Rhythm: Normal rate and regular rhythm.      Heart sounds: Normal heart sounds.   Pulmonary:      Effort: Pulmonary effort is normal.      Breath sounds: Normal breath sounds.   Neurological:      Mental Status: She is alert and oriented to person, place, and time.      Gait: Gait normal.   Psychiatric:         Behavior: Behavior normal.         Thought Content: Thought content normal.         Judgment: Judgment normal.        Result Review :   The following data was reviewed by: VY Cole on 01/24/2022:                              Assessment and Plan    Diagnoses and all orders for this visit:    1. Hyperlipidemia, unspecified hyperlipidemia type (Primary)  -     Comprehensive Metabolic Panel  -     Lipid Panel With LDL / HDL Ratio  -     atorvastatin (LIPITOR) 40 MG tablet; Take 1 tablet by mouth " Daily.  Dispense: 90 tablet; Refill: 3    2. Primary hypertension  -     losartan (COZAAR) 100 MG tablet; Take 1 tablet by mouth Daily.  Dispense: 90 tablet; Refill: 3    3. Adult hypothyroidism  -     TSH    4. Screening for iron deficiency anemia  -     CBC & Differential    5. Screening for diabetes mellitus  -     Comprehensive Metabolic Panel    6. Encounter for screening mammogram for malignant neoplasm of breast  -     Mammo Screening Bilateral With CAD; Future    7. Colon cancer screening  -     Cologuard - Stool, Per Rectum; Future    8. At risk for dental problems  -     clindamycin (CLEOCIN) 300 MG capsule; As needed when has a dental appt  Dispense: 10 capsule; Refill: 0        Follow Up   Return in about 6 months (around 7/24/2022) for Recheck.  Patient was given instructions and counseling regarding her condition or for health maintenance advice. Please see specific information pulled into the AVS if appropriate.     Cont same   Follow up after labs     Mask and googles worn

## 2022-01-27 ENCOUNTER — TRANSCRIBE ORDERS (OUTPATIENT)
Dept: ADMINISTRATIVE | Facility: HOSPITAL | Age: 72
End: 2022-01-27

## 2022-01-27 DIAGNOSIS — Z12.31 SCREENING MAMMOGRAM FOR BREAST CANCER: Primary | ICD-10-CM

## 2022-01-31 ENCOUNTER — TELEPHONE (OUTPATIENT)
Dept: FAMILY MEDICINE CLINIC | Facility: CLINIC | Age: 72
End: 2022-01-31

## 2022-01-31 DIAGNOSIS — Z91.89 AT RISK FOR DENTAL PROBLEMS: ICD-10-CM

## 2022-01-31 DIAGNOSIS — I10 PRIMARY HYPERTENSION: ICD-10-CM

## 2022-01-31 DIAGNOSIS — E78.5 HYPERLIPIDEMIA, UNSPECIFIED HYPERLIPIDEMIA TYPE: ICD-10-CM

## 2022-01-31 RX ORDER — CLINDAMYCIN HYDROCHLORIDE 300 MG/1
CAPSULE ORAL
Qty: 10 CAPSULE | Refills: 0 | Status: SHIPPED | OUTPATIENT
Start: 2022-01-31

## 2022-01-31 RX ORDER — ATORVASTATIN CALCIUM 40 MG/1
40 TABLET, FILM COATED ORAL DAILY
Qty: 90 TABLET | Refills: 3 | Status: SHIPPED | OUTPATIENT
Start: 2022-01-31 | End: 2023-01-30

## 2022-01-31 RX ORDER — LOSARTAN POTASSIUM 100 MG/1
100 TABLET ORAL DAILY
Qty: 90 TABLET | Refills: 3 | Status: SHIPPED | OUTPATIENT
Start: 2022-01-31 | End: 2023-01-17

## 2022-01-31 NOTE — TELEPHONE ENCOUNTER
Caller: Jessi Ruby    Relationship: Self    Best call back number: [phone] (H)    What test was performed: LAB WORK, YEARLY ROUTINE LABS     When was the test performed: 01/24/22     Where was the test performed: AT THE OFFICE    Additional notes:       PLEASE GIVE PATIENT A CALL TO DISCUSS RESULTS

## 2022-02-06 LAB
ALBUMIN SERPL-MCNC: 4.5 G/DL (ref 3.7–4.7)
ALBUMIN/GLOB SERPL: 1.9 {RATIO} (ref 1.2–2.2)
ALP SERPL-CCNC: 93 IU/L (ref 44–121)
ALT SERPL-CCNC: 36 IU/L (ref 0–32)
AST SERPL-CCNC: 28 IU/L (ref 0–40)
BASOPHILS # BLD AUTO: 0.1 X10E3/UL (ref 0–0.2)
BASOPHILS NFR BLD AUTO: 1 %
BILIRUB SERPL-MCNC: 0.7 MG/DL (ref 0–1.2)
BUN SERPL-MCNC: 14 MG/DL (ref 8–27)
BUN/CREAT SERPL: 20 (ref 12–28)
CALCIUM SERPL-MCNC: 9.8 MG/DL (ref 8.7–10.3)
CHLORIDE SERPL-SCNC: 103 MMOL/L (ref 96–106)
CHOLEST SERPL-MCNC: 152 MG/DL (ref 100–199)
CO2 SERPL-SCNC: 23 MMOL/L (ref 20–29)
CREAT SERPL-MCNC: 0.7 MG/DL (ref 0.57–1)
EOSINOPHIL # BLD AUTO: 0.1 X10E3/UL (ref 0–0.4)
EOSINOPHIL NFR BLD AUTO: 1 %
ERYTHROCYTE [DISTWIDTH] IN BLOOD BY AUTOMATED COUNT: 11.6 % (ref 11.7–15.4)
FOLATE SERPL-MCNC: 12.5 NG/ML
GLOBULIN SER CALC-MCNC: 2.4 G/DL (ref 1.5–4.5)
GLUCOSE SERPL-MCNC: 89 MG/DL (ref 65–99)
HCT VFR BLD AUTO: 41.2 % (ref 34–46.6)
HDLC SERPL-MCNC: 43 MG/DL
HGB BLD-MCNC: 14.1 G/DL (ref 11.1–15.9)
IMM GRANULOCYTES # BLD AUTO: 0 X10E3/UL (ref 0–0.1)
IMM GRANULOCYTES NFR BLD AUTO: 0 %
LDLC SERPL CALC-MCNC: 74 MG/DL (ref 0–99)
LDLC/HDLC SERPL: 1.7 RATIO (ref 0–3.2)
LYMPHOCYTES # BLD AUTO: 1.8 X10E3/UL (ref 0.7–3.1)
LYMPHOCYTES NFR BLD AUTO: 26 %
MCH RBC QN AUTO: 32.6 PG (ref 26.6–33)
MCHC RBC AUTO-ENTMCNC: 34.2 G/DL (ref 31.5–35.7)
MCV RBC AUTO: 95 FL (ref 79–97)
MONOCYTES # BLD AUTO: 0.6 X10E3/UL (ref 0.1–0.9)
MONOCYTES NFR BLD AUTO: 8 %
NEUTROPHILS # BLD AUTO: 4.5 X10E3/UL (ref 1.4–7)
NEUTROPHILS NFR BLD AUTO: 64 %
PLATELET # BLD AUTO: 238 X10E3/UL (ref 150–450)
POTASSIUM SERPL-SCNC: 4.4 MMOL/L (ref 3.5–5.2)
PROT SERPL-MCNC: 6.9 G/DL (ref 6–8.5)
RBC # BLD AUTO: 4.33 X10E6/UL (ref 3.77–5.28)
SODIUM SERPL-SCNC: 141 MMOL/L (ref 134–144)
TRIGL SERPL-MCNC: 208 MG/DL (ref 0–149)
TSH SERPL DL<=0.005 MIU/L-ACNC: 0.21 UIU/ML (ref 0.45–4.5)
VIT B12 SERPL-MCNC: 1284 PG/ML (ref 232–1245)
VIT B6 SERPL-MCNC: 7 UG/L (ref 2–32.8)
VLDLC SERPL CALC-MCNC: 35 MG/DL (ref 5–40)
WBC # BLD AUTO: 7.1 X10E3/UL (ref 3.4–10.8)

## 2022-02-08 ENCOUNTER — HOSPITAL ENCOUNTER (OUTPATIENT)
Dept: MAMMOGRAPHY | Facility: HOSPITAL | Age: 72
Discharge: HOME | End: 2022-02-08
Admitting: NURSE PRACTITIONER

## 2022-02-08 DIAGNOSIS — Z12.31 SCREENING MAMMOGRAM FOR BREAST CANCER: ICD-10-CM

## 2022-02-08 PROCEDURE — 77067 SCR MAMMO BI INCL CAD: CPT

## 2022-02-08 PROCEDURE — 77063 BREAST TOMOSYNTHESIS BI: CPT

## 2022-02-10 ENCOUNTER — TELEPHONE (OUTPATIENT)
Dept: FAMILY MEDICINE CLINIC | Facility: CLINIC | Age: 72
End: 2022-02-10

## 2022-02-10 DIAGNOSIS — R92.8 ABNORMAL MAMMOGRAM OF RIGHT BREAST: Primary | ICD-10-CM

## 2022-02-10 NOTE — TELEPHONE ENCOUNTER
l     Caller: Juancarlos Ruby    Relationship: Self    Best call back number: [phone]    What is the best time to reach you: ANY TIME, ASAP    Who are you requesting to speak with (clinical staff, provider,  specific staff member): MARLENE MEDLEY    Do you know the name of the person who called: JUANCARLOS RUBY    What was the call regarding: MAMMOGRAM AND LAB TEST.    Do you require a callback: YES.        PLEASE ADVISE.

## 2022-03-01 ENCOUNTER — TELEPHONE (OUTPATIENT)
Dept: FAMILY MEDICINE CLINIC | Facility: CLINIC | Age: 72
End: 2022-03-01

## 2022-03-01 DIAGNOSIS — E03.9 ADULT HYPOTHYROIDISM: Primary | ICD-10-CM

## 2022-03-01 NOTE — TELEPHONE ENCOUNTER
May a thyroid lab be placed for patient, unable to schedule 6 wk lab draw without order. Please advise.   no

## 2022-03-02 DIAGNOSIS — E03.9 ADULT HYPOTHYROIDISM: ICD-10-CM

## 2022-03-15 ENCOUNTER — HOSPITAL ENCOUNTER (OUTPATIENT)
Dept: MAMMOGRAPHY | Facility: HOSPITAL | Age: 72
Discharge: HOME | End: 2022-03-15

## 2022-03-15 ENCOUNTER — HOSPITAL ENCOUNTER (OUTPATIENT)
Dept: ULTRASOUND IMAGING | Facility: HOSPITAL | Age: 72
Discharge: HOME | End: 2022-03-15

## 2022-03-15 DIAGNOSIS — R92.8 ABNORMAL MAMMOGRAM OF RIGHT BREAST: ICD-10-CM

## 2022-03-15 PROCEDURE — 77065 DX MAMMO INCL CAD UNI: CPT

## 2022-03-15 PROCEDURE — 76642 ULTRASOUND BREAST LIMITED: CPT

## 2022-03-15 PROCEDURE — G0279 TOMOSYNTHESIS, MAMMO: HCPCS

## 2022-03-17 DIAGNOSIS — R92.8 OTHER ABNORMAL AND INCONCLUSIVE FINDINGS ON DIAGNOSTIC IMAGING OF BREAST: ICD-10-CM

## 2022-03-17 DIAGNOSIS — N60.01 CYST OF BREAST, RIGHT: Primary | ICD-10-CM

## 2022-03-22 DIAGNOSIS — R92.8 ABNORMAL MAMMOGRAM OF RIGHT BREAST: ICD-10-CM

## 2022-03-22 DIAGNOSIS — R92.8 OTHER ABNORMAL AND INCONCLUSIVE FINDINGS ON DIAGNOSTIC IMAGING OF BREAST: Primary | ICD-10-CM

## 2022-03-29 LAB — TSH SERPL DL<=0.005 MIU/L-ACNC: 3.76 UIU/ML (ref 0.45–4.5)

## 2022-03-29 RX ORDER — LEVOTHYROXINE SODIUM 0.1 MG/1
100 TABLET ORAL DAILY
Qty: 90 TABLET | Refills: 1 | Status: SHIPPED | OUTPATIENT
Start: 2022-03-29 | End: 2022-09-15

## 2022-04-19 ENCOUNTER — HOSPITAL ENCOUNTER (OUTPATIENT)
Dept: ULTRASOUND IMAGING | Facility: HOSPITAL | Age: 72
Discharge: HOME | End: 2022-04-19

## 2022-04-19 ENCOUNTER — HOSPITAL ENCOUNTER (OUTPATIENT)
Dept: MAMMOGRAPHY | Facility: HOSPITAL | Age: 72
Discharge: HOME | End: 2022-04-19

## 2022-04-19 VITALS
DIASTOLIC BLOOD PRESSURE: 80 MMHG | SYSTOLIC BLOOD PRESSURE: 164 MMHG | OXYGEN SATURATION: 99 % | RESPIRATION RATE: 16 BRPM | HEART RATE: 77 BPM

## 2022-04-19 VITALS
OXYGEN SATURATION: 99 % | SYSTOLIC BLOOD PRESSURE: 162 MMHG | HEIGHT: 63 IN | WEIGHT: 178 LBS | RESPIRATION RATE: 16 BRPM | BODY MASS INDEX: 31.54 KG/M2 | TEMPERATURE: 98.1 F | DIASTOLIC BLOOD PRESSURE: 82 MMHG | HEART RATE: 82 BPM

## 2022-04-19 DIAGNOSIS — R92.8 OTHER ABNORMAL AND INCONCLUSIVE FINDINGS ON DIAGNOSTIC IMAGING OF BREAST: ICD-10-CM

## 2022-04-19 DIAGNOSIS — R92.8 ABNORMAL MAMMOGRAM OF RIGHT BREAST: ICD-10-CM

## 2022-04-19 DIAGNOSIS — N60.01 CYST OF BREAST, RIGHT: ICD-10-CM

## 2022-04-19 PROCEDURE — A4648 IMPLANTABLE TISSUE MARKER: HCPCS

## 2022-04-19 PROCEDURE — 88305 TISSUE EXAM BY PATHOLOGIST: CPT | Performed by: NURSE PRACTITIONER

## 2022-04-19 PROCEDURE — 0 LIDOCAINE 1 % SOLUTION: Performed by: NURSE PRACTITIONER

## 2022-04-19 PROCEDURE — 0 LIDOCAINE 1 % SOLUTION: Performed by: RADIOLOGY

## 2022-04-19 RX ORDER — LIDOCAINE HYDROCHLORIDE AND EPINEPHRINE 10; 10 MG/ML; UG/ML
10 INJECTION, SOLUTION INFILTRATION; PERINEURAL ONCE
Status: COMPLETED | OUTPATIENT
Start: 2022-04-19 | End: 2022-04-19

## 2022-04-19 RX ORDER — DIAZEPAM 5 MG/1
5 TABLET ORAL ONCE AS NEEDED
Status: DISCONTINUED | OUTPATIENT
Start: 2022-04-19 | End: 2022-04-20 | Stop reason: HOSPADM

## 2022-04-19 RX ORDER — LIDOCAINE HYDROCHLORIDE 10 MG/ML
10 INJECTION, SOLUTION INFILTRATION; PERINEURAL ONCE
Status: COMPLETED | OUTPATIENT
Start: 2022-04-19 | End: 2022-04-19

## 2022-04-19 RX ORDER — LIDOCAINE HYDROCHLORIDE 10 MG/ML
1 INJECTION, SOLUTION INFILTRATION; PERINEURAL ONCE
Status: COMPLETED | OUTPATIENT
Start: 2022-04-19 | End: 2022-04-19

## 2022-04-19 RX ADMIN — LIDOCAINE HYDROCHLORIDE 10 ML: 10 INJECTION, SOLUTION INFILTRATION; PERINEURAL at 13:58

## 2022-04-19 RX ADMIN — Medication 1 ML: at 12:57

## 2022-04-19 RX ADMIN — LIDOCAINE HYDROCHLORIDE 25 ML: 10; .005 INJECTION, SOLUTION EPIDURAL; INFILTRATION; INTRACAUDAL; PERINEURAL at 12:58

## 2022-04-19 RX ADMIN — LIDOCAINE HYDROCHLORIDE,EPINEPHRINE BITARTRATE 10 ML: 10; .01 INJECTION, SOLUTION INFILTRATION; PERINEURAL at 13:59

## 2022-04-19 NOTE — H&P
Name: Jessi Ruby ADMIT: 2022   : 1950  PCP: Andrez Chaney APRN    MRN: [id number] LOS: 0 days   AGE/SEX: 71 y.o. female  ROOM: Room/bed info not found       Chief complaint right breast lesions x 2    Present Illness or Internal History:  Patient is a 71 y.o. female presents with right breast lesions x 2.     Past Surgical History:  Past Surgical History:   Procedure Laterality Date   • D & C AND LAPAROSCOPY     • KNEE SURGERY     • LASER ABLATION         Past Medical History:  Past Medical History:   Diagnosis Date   • Hyperlipidemia    • Hypertension    • Hyperthyroidism        Home Medications:  (Not in a hospital admission)      Allergies:  Penicillins    Family History:  Family History   Problem Relation Age of Onset   • Alzheimer's disease Mother    • Cancer Father    • Drug abuse Daughter    • Breast cancer Neg Hx    • Ovarian cancer Neg Hx        Social History:  Social History     Tobacco Use   • Smoking status: Current Some Day Smoker   • Smokeless tobacco: Never Used   Substance Use Topics   • Alcohol use: Yes   • Drug use: Never        Objective     Physical Exam:    No exam performed today,    Vital Signs       Anticipated Surgical Procedure:  tomosynthesis guided and ultrasound right breast biopsies x 2 with clip placement    The risks, benefits and alternatives of this procedure have been discussed with the patient or responsible party: Yes        Nikos Oliva Jr., MD  22  12:30 EDT

## 2022-04-19 NOTE — NURSING NOTE
Biopsy site to right breast clear with Dermabond dry and intact. No firmness or swelling noted at or around biopsy site. Denies pain. Pt VS taken and notified US she was ready for next procedure. Answered all pt questions.  NAD noted.

## 2022-04-19 NOTE — NURSING NOTE
Biopsy site to right breast clear with Skin Exofin dry and intact. No firmness or swelling noted at or around biopsy site. Denies pain. Ice pack with protective covering applied to biopsy site. Discharge instructions discussed with understanding voiced by patient. Pt had post mammo. Dr. Oliva stated they were good images, pt ok to leave. Copies of discharge instructions provided to patient. No distress noted. To home via private vehicle.

## 2022-04-20 LAB
LAB AP CASE REPORT: NORMAL
LAB AP CLINICAL INFORMATION: NORMAL
LAB AP DIAGNOSIS COMMENT: NORMAL
PATH REPORT.FINAL DX SPEC: NORMAL
PATH REPORT.GROSS SPEC: NORMAL

## 2022-04-21 DIAGNOSIS — R92.8 ABNORMAL MAMMOGRAM OF RIGHT BREAST: Primary | ICD-10-CM

## 2022-06-08 ENCOUNTER — OFFICE VISIT (OUTPATIENT)
Dept: SURGERY | Facility: CLINIC | Age: 72
End: 2022-06-08

## 2022-06-08 VITALS — BODY MASS INDEX: 32.43 KG/M2 | HEIGHT: 63 IN | WEIGHT: 183 LBS

## 2022-06-08 DIAGNOSIS — N64.89 RADIAL SCAR OF RIGHT BREAST: Primary | ICD-10-CM

## 2022-06-08 PROCEDURE — 99203 OFFICE O/P NEW LOW 30 MIN: CPT | Performed by: STUDENT IN AN ORGANIZED HEALTH CARE EDUCATION/TRAINING PROGRAM

## 2022-06-08 NOTE — PROGRESS NOTES
GENERAL SURGERY BENIGN BREAST HISTORY AND PHYSICAL     SUMMARY:  Jessi Ruby is a 71 y.o. lady with:  A new diagnosis of a right breast radial scar.  -Surgical plan: Recommend right breast wire localized excisional biopsy of radial scar (9:00, 2 cm from the nipple, tri-bell clip). Risks (including bleeding, infection, damage to surrounding structures, need for additional surgery), benefits and alternatives were discussed with the patient who agreed and wished to proceed. Risk of pathologic upgrade was also discussed with possible need for additional treatment.     High risk screening:   -Will calculate Deshaun Vaz lifetime breast cancer risk postoperatively    Referring Provider: No ref. provider found    Chief complaint: abnormal breast imaging    HPI: Ms. Jessi Ruby is seen at the request of No ref. provider found. The patient is a 71year old woman being seen for a new diagnosis of right breast radial scar.      This was initially detected as an imaging abnormality on routine screening. Her work-up is detailed in the breast history section below. She has had regular annual mammogram; she did miss one during COVID. She denies any prior history of abnormal mammograms or breast biopsies. She denies any breast lumps, pain, skin changes, or nipple discharge.    She denies any family history of breast or ovarian cancer.     TIMELINE OF WORKUP:  2/8/2022 Bilateral Screening Mammogram   IMPRESSION/RECOMMENDATION(S):  Indeterminate right breast focal asymmetry with questioned distortion.  BI-RADS Category 0: Incomplete    3/15/2022 Right Breast Diagnostic Mammogram with US  With additional imaging there is partial persistence of the area of focal asymmetry in the posterior one third of the right breast  located lateral to the plane of the nipple. The area partially resolves on spot compression 90-degree lateral imaging but appears more dense on spot compression CC imaging. There are microcalcifications seen in the  region that have become more coarse when compared to prior examinations and have not increased in number or distribution when compared to prior  examinations.    ULTRASOUND: Targeted sonographic evaluation of the right breast was performed through the upper outer quadrant. At the 9 o'clock position on the order of 2 cm from the nipple there is a 0.9 x 0.4 x 0.3 cm ill-defined hypoechoic region surrounded by an area of increased echogenicity. No other abnormality is appreciated.  IMPRESSION:  1. There is a partially persistent area of asymmetry/density in the posterior one third of the right breast located lateral to the plane of the nipple. No sonographic correlate is appreciated. Correlation with a tomosynthesis guided right breast biopsy is recommended.  2. There is a 0.9 cm heterogenous ill-defined hypoechoic and hyperechoic region/lesion in the right breast at the 9 o'clock position on the order of 2 cm from the nipple. Correlation with an ultrasound-guided right breast biopsy is recommended.  BI-RADS CATEGORY 4: Suspicious abnormality or suspicious finding. Biopsy should be considered.    4/19/2022 Right Breast US Guided and Stereotactic Guided Biopsy:   The lesion at the 9-o'clock position in the posterior one-third was visualized. Multiple tissue specimens were obtained. A barbell shaped metallic clip was placed to franko the site.      Preliminary sonography of the right breast was performed. The lesion at the 9-o'clock position on the order of 2 cm from the nipple was visualized. Multiple tissue specimens were obtained. A tri bell-shaped metallic clip was placed to franko the site.      Postbiopsy digital CC and 90 unilateral images were obtained and demonstrate the barbell-shaped and tri bell-shaped metallic clips at the respective biopsy sites in the right breast. No evidence for clip migration or for hematoma is appreciated.     The pathology result from the 9-o'clock posterior one-third Tomosynthesis biopsy  has returned as a radial scar. The pathology result from the 9-o'clock, 2 cm from nipple ultrasound guided biopsy has returned as benign hyalinized breast stroma and ducts. Both are concordant with imaging findings.     4/19/2022 Pathology:   Final Diagnosis   1. Right Breast, 9 o'clock, Stereotatic Biopsies for Asymmetry:               A. Focal changes suggesting edge of benign radial scar.               B. No atypical hyperplasia, in situ nor invasive carcinoma identified.                 2. Right Breast, 9 o'clock, 2 cm from Nipple, Stereotactic Biopsies for Asymmetry:               A. Benign hyalinized breast stroma and ducts.               B. No atypical hyperplasia, in situ nor invasive carcinoma identified.     MEDICAL HISTORY:   Gynecologic History:   G:3. P:3 AB:0  Age at first childbirth: 27  Lactation/How long: x3  Age at menarche: 12  Age at menopause: 50s  Total years of oral contraceptive use: Previously  Total years of hormone replacement therapy: None    Past Medical History:   • Hypothyroidism   • HTN  • HLD    Past Surgical History:    • Endometrial ablation  • Bilateral knee surgery     Family History:    • As above     Social History:   • Denies tobacco use, quit a long time ago, socially smokes sometimes  • Occasional alcohol use    Allergies:   Allergies   Allergen Reactions   • Penicillins Anaphylaxis       Medications:     Current Outpatient Medications:   •  atorvastatin (LIPITOR) 40 MG tablet, Take 1 tablet by mouth Daily., Disp: 90 tablet, Rfl: 3  •  clindamycin (CLEOCIN) 300 MG capsule, As needed when has a dental appt, Disp: 10 capsule, Rfl: 0  •  levothyroxine (SYNTHROID, LEVOTHROID) 100 MCG tablet, Take 1 tablet by mouth Daily., Disp: 90 tablet, Rfl: 1  •  losartan (COZAAR) 100 MG tablet, Take 1 tablet by mouth Daily., Disp: 90 tablet, Rfl: 3    Labs:    Labs from January 24, 2022 reviewed    ROS:   Influenza-like illness: no fever, no  cough, no  sore throat, no  body aches, no loss  of sense of taste or smell, no known exposure to person with Covid-19.  Constitutional: Negative for fevers or chills  HENT: Negative for hearing loss or runny nose  Eyes: Negative for vision changes or scleral icterus  Respiratory: Negative for cough or shortness of breath  Cardiovascular: Negative for chest pain or heart palpitations  Gastrointestinal: Negative for abdominal pain, nausea, vomiting, constipation, melena, or hematochezia  Genitourinary: Negative for hematuria or dysuria  Musculoskeletal: Negative for joint swelling or gait instability  Neurologic: Negative for tremors or seizures  Psychiatric: Negative for suicidal ideations or depression  All other systems reviewed and negative    PHYSICAL EXAM:   • Constitutional: Well-developed well-nourished, no acute distress  • Eyes: Conjunctiva normal, sclera nonicteric  • ENMT: Hearing grossly normal, oral mucosa moist  • Neck: Supple, trachea midline  • Respiratory: Clear to auscultation, normal inspiratory effort  • Cardiovascular: Regular rate, no murmur, no peripheral edema, no jugular venous distention  • Breast: symmetric  o Right: No visible abnormalities on inspection while seated, with arms raised or hands on hips. No masses, skin changes, or nipple abnormalities.  o Left: No visible abnormalities on inspection while seated, with arms raised or hands on hips. No masses, skin changes, or nipple abnormalities.  o Biopsy site appreciated in right outer mid breast, otherwise no skin changes.   o No clinical chest wall involvement.  • Gastrointestinal: Soft, nontender  • Lymphatics (palpable nodes): No cervical, supraclavicular or axillary lymphadenopathy  • Skin:  Warm, dry, no rash on visualized skin surfaces  • Musculoskeletal: Symmetric strength, normal gait  • Psychiatric: Alert and oriented ×3, normal affect     REBECCA RASMUSSEN M.D.  General and Endoscopic Surgery  [facility]    [address], [address]  [place], [place], [address]  P:  [phone]  F: [phone]

## 2022-06-19 ENCOUNTER — PREP FOR SURGERY (OUTPATIENT)
Dept: OTHER | Facility: HOSPITAL | Age: 72
End: 2022-06-19

## 2022-06-19 DIAGNOSIS — N64.89 RADIAL SCAR OF RIGHT BREAST: Primary | ICD-10-CM

## 2022-06-19 RX ORDER — CLINDAMYCIN PHOSPHATE 900 MG/50ML
900 INJECTION INTRAVENOUS ONCE
Status: CANCELLED | OUTPATIENT
Start: 2022-07-28 | End: 2022-06-19

## 2022-06-19 RX ORDER — DIAZEPAM 5 MG/1
5 TABLET ORAL ONCE
Status: CANCELLED | OUTPATIENT
Start: 2022-07-28 | End: 2022-06-19

## 2022-06-20 ENCOUNTER — PREP FOR SURGERY (OUTPATIENT)
Dept: OTHER | Facility: HOSPITAL | Age: 72
End: 2022-06-20

## 2022-06-20 ENCOUNTER — TELEPHONE (OUTPATIENT)
Dept: SURGERY | Facility: CLINIC | Age: 72
End: 2022-06-20

## 2022-06-20 DIAGNOSIS — N64.89 RADIAL SCAR OF RIGHT BREAST: Primary | ICD-10-CM

## 2022-06-20 NOTE — TELEPHONE ENCOUNTER
----- Message from Sera Quinones MD sent at 6/19/2022  8:16 PM EDT -----  Regarding: surg scheduling  BREAST: NONCANCER    Could you add on Jessi Ruby for right breast wire localized excisional biopsy of radial scar (9:00, 2 cm from the nipple, tri-bell clip) at your convenience?    Thanks,   Sera Quinones

## 2022-07-26 ENCOUNTER — PRE-ADMISSION TESTING (OUTPATIENT)
Dept: PREADMISSION TESTING | Facility: HOSPITAL | Age: 72
End: 2022-07-26

## 2022-07-26 VITALS
BODY MASS INDEX: 32.6 KG/M2 | RESPIRATION RATE: 16 BRPM | DIASTOLIC BLOOD PRESSURE: 90 MMHG | HEART RATE: 82 BPM | OXYGEN SATURATION: 98 % | TEMPERATURE: 97 F | HEIGHT: 63 IN | WEIGHT: 184 LBS | SYSTOLIC BLOOD PRESSURE: 154 MMHG

## 2022-07-26 DIAGNOSIS — N64.89 RADIAL SCAR OF RIGHT BREAST: ICD-10-CM

## 2022-07-26 LAB
ANION GAP SERPL CALCULATED.3IONS-SCNC: 9.6 MMOL/L (ref 5–15)
BUN SERPL-MCNC: 12 MG/DL (ref 8–23)
BUN/CREAT SERPL: 19.4 (ref 7–25)
CALCIUM SPEC-SCNC: 9.4 MG/DL (ref 8.6–10.5)
CHLORIDE SERPL-SCNC: 102 MMOL/L (ref 98–107)
CO2 SERPL-SCNC: 27.4 MMOL/L (ref 22–29)
CREAT SERPL-MCNC: 0.62 MG/DL (ref 0.57–1)
DEPRECATED RDW RBC AUTO: 41.2 FL (ref 37–54)
EGFRCR SERPLBLD CKD-EPI 2021: 94.8 ML/MIN/1.73
ERYTHROCYTE [DISTWIDTH] IN BLOOD BY AUTOMATED COUNT: 12 % (ref 12.3–15.4)
GLUCOSE SERPL-MCNC: 104 MG/DL (ref 65–99)
HCT VFR BLD AUTO: 40 % (ref 34–46.6)
HGB BLD-MCNC: 14 G/DL (ref 12–15.9)
MCH RBC QN AUTO: 33 PG (ref 26.6–33)
MCHC RBC AUTO-ENTMCNC: 35 G/DL (ref 31.5–35.7)
MCV RBC AUTO: 94.3 FL (ref 79–97)
PLATELET # BLD AUTO: 208 10*3/MM3 (ref 140–450)
PMV BLD AUTO: 11.4 FL (ref 6–12)
POTASSIUM SERPL-SCNC: 4.4 MMOL/L (ref 3.5–5.2)
QT INTERVAL: 399 MS
RBC # BLD AUTO: 4.24 10*6/MM3 (ref 3.77–5.28)
SARS-COV-2 ORF1AB RESP QL NAA+PROBE: NOT DETECTED
SODIUM SERPL-SCNC: 139 MMOL/L (ref 136–145)
WBC NRBC COR # BLD: 6.59 10*3/MM3 (ref 3.4–10.8)

## 2022-07-26 PROCEDURE — 36415 COLL VENOUS BLD VENIPUNCTURE: CPT

## 2022-07-26 PROCEDURE — 93010 ELECTROCARDIOGRAM REPORT: CPT | Performed by: INTERNAL MEDICINE

## 2022-07-26 PROCEDURE — 85027 COMPLETE CBC AUTOMATED: CPT

## 2022-07-26 PROCEDURE — 80048 BASIC METABOLIC PNL TOTAL CA: CPT

## 2022-07-26 PROCEDURE — 93005 ELECTROCARDIOGRAM TRACING: CPT

## 2022-07-26 PROCEDURE — U0004 COV-19 TEST NON-CDC HGH THRU: HCPCS

## 2022-07-26 PROCEDURE — C9803 HOPD COVID-19 SPEC COLLECT: HCPCS

## 2022-07-26 PROCEDURE — U0005 INFEC AGEN DETEC AMPLI PROBE: HCPCS

## 2022-07-26 NOTE — DISCHARGE INSTRUCTIONS
Take the following medications the morning of surgery:      LEVOTHYROXINE    ARRIVE AT 8:30    If you are on prescription narcotic pain medication to control your pain you may also take that medication the morning of surgery.    General Instructions:  Do not eat solid food after midnight the night before surgery.  You may drink clear liquids day of surgery but must stop at least one hour before your hospital arrival time.  It is beneficial for you to have a clear drink that contains carbohydrates the day of surgery.  We suggest a 12 to 20 ounce bottle of Gatorade or Powerade for non-diabetic patients or a 12 to 20 ounce bottle of G2 or Powerade Zero for diabetic patients. (Pediatric patients, are not advised to drink a 12 to 20 ounce carbohydrate drink)    Clear liquids are liquids you can see through.  Nothing red in color.     Plain water                               Sports drinks  Sodas                                   Gelatin (Jell-O)  Fruit juices without pulp such as white grape juice and apple juice  Popsicles that contain no fruit or yogurt  Tea or coffee (no cream or milk added)  Gatorade / Powerade  G2 / Powerade Zero    Patients who avoid smoking, chewing tobacco and alcohol for 4 weeks prior to surgery have a reduced risk of post-operative complications.  Quit smoking as many days before surgery as you can.  Do not smoke, use chewing tobacco or drink alcohol the day of surgery.   If applicable bring your C-PAP/ BI-PAP machine.  Bring any papers given to you in the doctor’s office.  Wear clean comfortable clothes.  Do not wear contact lenses, false eyelashes or make-up.  Bring a case for your glasses.   Bring crutches or walker if applicable.  Remove all piercings.  Leave jewelry and any other valuables at home.  Hair extensions with metal clips must be removed prior to surgery.  The Pre-Admission Testing nurse will instruct you to bring medications if unable to obtain an accurate list in Pre-Admission  Testing.          Preventing a Surgical Site Infection:  For 2 to 3 days before surgery, avoid shaving with a razor because the razor can irritate skin and make it easier to develop an infection.    Any areas of open skin can increase the risk of a post-operative wound infection by allowing bacteria to enter and travel throughout the body.  Notify your surgeon if you have any skin wounds / rashes even if it is not near the expected surgical site.  The area will need assessed to determine if surgery should be delayed until it is healed.  The night prior to surgery shower using a fresh bar of anti-bacterial soap (such as Dial) and clean washcloth.  Sleep in a clean bed with clean clothing.  Do not allow pets to sleep with you.  Shower on the morning of surgery using a fresh bar of anti-bacterial soap (such as Dial) and clean washcloth.  Dry with a clean towel and dress in clean clothing.  Ask your surgeon if you will be receiving antibiotics prior to surgery.  Make sure you, your family, and all healthcare providers clean their hands with soap and water or an alcohol based hand  before caring for you or your wound.    Day of surgery:  Your arrival time is approximately two hours before your scheduled surgery time.  Upon arrival, a Pre-op nurse and Anesthesiologist will review your health history, obtain vital signs, and answer questions you may have.  The only belongings needed at this time will be a list of your home medications and if applicable your C-PAP/BI-PAP machine.  A Pre-op nurse will start an IV and you may receive medication in preparation for surgery, including something to help you relax.     Please be aware that surgery does come with discomfort.  We want to make every effort to control your discomfort so please discuss any uncontrolled symptoms with your nurse.   Your doctor will most likely have prescribed pain medications.      If you are going home after surgery you will receive  individualized written care instructions before being discharged.  A responsible adult must drive you to and from the hospital on the day of your surgery and stay with you for 24 hours.  Discharge prescriptions can be filled by the hospital pharmacy during regular pharmacy hours.  If you are having surgery late in the day/evening your prescription may be e-prescribed to your pharmacy.  Please verify your pharmacy hours or chose a 24 hour pharmacy to avoid not having access to your prescription because your pharmacy has closed for the day.    If you are staying overnight following surgery, you will be transported to your hospital room following the recovery period.  [facility] has all private rooms.    If you have any questions please call Pre-Admission Testing at [phone].  Deductibles and co-payments are collected on the day of service. Please be prepared to pay the required co-pay, deductible or deposit on the day of service as defined by your plan.    Patient Education for Self-Quarantine Process    Following your COVID testing, we strongly recommend that you wear a mask when you are with other people and practice social distancing.   Limit your activities to only required outings.  Wash your hands with soap and water frequently for at least 20 seconds.   Avoid touching your eyes, nose and mouth with unwashed hands.  Do not share anything - utensils, drinking glasses, food from the same bowl.   Sanitize household surfaces daily. Include all high touch areas (door handles, light switches, phones, countertops, etc.)    Call your surgeon immediately if you experience any of the following symptoms:  Sore Throat  Shortness of Breath or difficulty breathing  Cough  Chills  Body soreness or muscle pain  Headache  Fever  New loss of taste or smell  Do not arrive for your surgery ill.  Your procedure will need to be rescheduled to another time.  You will need to call your physician before the day of  surgery to avoid any unnecessary exposure to hospital staff as well as other patients.         CHLORHEXIDINE CLOTH INSTRUCTIONS  The morning of surgery follow these instructions using the Chlorhexidine cloths you've been given.  These steps reduce bacteria on the body.  Do not use the cloths near your eyes, ears mouth, genitalia or on open wounds.  Throw the cloths away after use but do not try to flush them down a toilet.      Open and remove one cloth at a time from the package.    Leave the cloth unfolded and begin the bathing.  Massage the skin with the cloths using gentle pressure to remove bacteria.  Do not scrub harshly.   Follow the steps below with one 2% CHG cloth per area (6 total cloths).  One cloth for neck, shoulders and chest.  One cloth for both arms, hands, fingers and underarms (do underarms last).  One cloth for the abdomen followed by groin.  One cloth for right leg and foot including between the toes.  One cloth for left leg and foot including between the toes.  The last cloth is to be used for the back of the neck, back and buttocks.    Allow the CHG to air dry 3 minutes on the skin which will give it time to work and decrease the chance of irritation.  The skin may feel sticky until it is dry.  Do not rinse with water or any other liquid or you will lose the beneficial effects of the CHG.  If mild skin irritation occurs, do rinse the skin to remove the CHG.  Report this to the nurse at time of admission.  Do not apply lotions, creams, ointments, deodorants or perfumes after using the clothes. Dress in clean clothes before coming to the hospital.

## 2022-07-28 ENCOUNTER — ANESTHESIA (OUTPATIENT)
Dept: PERIOP | Facility: HOSPITAL | Age: 72
End: 2022-07-28

## 2022-07-28 ENCOUNTER — HOSPITAL ENCOUNTER (OUTPATIENT)
Facility: HOSPITAL | Age: 72
Setting detail: HOSPITAL OUTPATIENT SURGERY
Discharge: HOME | End: 2022-07-28
Attending: STUDENT IN AN ORGANIZED HEALTH CARE EDUCATION/TRAINING PROGRAM | Admitting: STUDENT IN AN ORGANIZED HEALTH CARE EDUCATION/TRAINING PROGRAM

## 2022-07-28 ENCOUNTER — APPOINTMENT (OUTPATIENT)
Dept: GENERAL RADIOLOGY | Facility: HOSPITAL | Age: 72
End: 2022-07-28

## 2022-07-28 ENCOUNTER — ANESTHESIA EVENT (OUTPATIENT)
Dept: PERIOP | Facility: HOSPITAL | Age: 72
End: 2022-07-28

## 2022-07-28 ENCOUNTER — HOSPITAL ENCOUNTER (OUTPATIENT)
Dept: MAMMOGRAPHY | Facility: HOSPITAL | Age: 72
Discharge: HOME | End: 2022-07-28

## 2022-07-28 VITALS
DIASTOLIC BLOOD PRESSURE: 82 MMHG | SYSTOLIC BLOOD PRESSURE: 116 MMHG | HEART RATE: 68 BPM | BODY MASS INDEX: 32.61 KG/M2 | OXYGEN SATURATION: 98 % | RESPIRATION RATE: 16 BRPM | WEIGHT: 184.08 LBS | TEMPERATURE: 97.5 F

## 2022-07-28 DIAGNOSIS — N64.89 RADIAL SCAR OF RIGHT BREAST: ICD-10-CM

## 2022-07-28 DIAGNOSIS — N64.89 RADIAL SCAR OF RIGHT BREAST: Primary | ICD-10-CM

## 2022-07-28 PROCEDURE — 25010000002 FENTANYL CITRATE (PF) 50 MCG/ML SOLUTION: Performed by: NURSE ANESTHETIST, CERTIFIED REGISTERED

## 2022-07-28 PROCEDURE — C1819 TISSUE LOCALIZATION-EXCISION: HCPCS

## 2022-07-28 PROCEDURE — 0 LIDOCAINE 1 % SOLUTION: Performed by: STUDENT IN AN ORGANIZED HEALTH CARE EDUCATION/TRAINING PROGRAM

## 2022-07-28 PROCEDURE — 19125 EXCISION BREAST LESION: CPT | Performed by: STUDENT IN AN ORGANIZED HEALTH CARE EDUCATION/TRAINING PROGRAM

## 2022-07-28 PROCEDURE — 76098 X-RAY EXAM SURGICAL SPECIMEN: CPT

## 2022-07-28 PROCEDURE — 88342 IMHCHEM/IMCYTCHM 1ST ANTB: CPT | Performed by: STUDENT IN AN ORGANIZED HEALTH CARE EDUCATION/TRAINING PROGRAM

## 2022-07-28 PROCEDURE — 88341 IMHCHEM/IMCYTCHM EA ADD ANTB: CPT | Performed by: STUDENT IN AN ORGANIZED HEALTH CARE EDUCATION/TRAINING PROGRAM

## 2022-07-28 PROCEDURE — 88307 TISSUE EXAM BY PATHOLOGIST: CPT | Performed by: STUDENT IN AN ORGANIZED HEALTH CARE EDUCATION/TRAINING PROGRAM

## 2022-07-28 PROCEDURE — 25010000002 PROPOFOL 10 MG/ML EMULSION: Performed by: NURSE ANESTHETIST, CERTIFIED REGISTERED

## 2022-07-28 RX ORDER — SODIUM CHLORIDE 0.9 % (FLUSH) 0.9 %
3-10 SYRINGE (ML) INJECTION AS NEEDED
Status: DISCONTINUED | OUTPATIENT
Start: 2022-07-28 | End: 2022-07-28 | Stop reason: HOSPADM

## 2022-07-28 RX ORDER — PROPOFOL 10 MG/ML
VIAL (ML) INTRAVENOUS CONTINUOUS PRN
Status: DISCONTINUED | OUTPATIENT
Start: 2022-07-28 | End: 2022-07-28 | Stop reason: SURG

## 2022-07-28 RX ORDER — DIPHENHYDRAMINE HYDROCHLORIDE 50 MG/ML
12.5 INJECTION INTRAMUSCULAR; INTRAVENOUS
Status: DISCONTINUED | OUTPATIENT
Start: 2022-07-28 | End: 2022-07-28 | Stop reason: HOSPADM

## 2022-07-28 RX ORDER — HYDROCODONE BITARTRATE AND ACETAMINOPHEN 5; 325 MG/1; MG/1
1 TABLET ORAL EVERY 6 HOURS PRN
Qty: 5 TABLET | Refills: 0 | Status: SHIPPED | OUTPATIENT
Start: 2022-07-28 | End: 2022-08-10

## 2022-07-28 RX ORDER — NALOXONE HCL 0.4 MG/ML
0.2 VIAL (ML) INJECTION AS NEEDED
Status: DISCONTINUED | OUTPATIENT
Start: 2022-07-28 | End: 2022-07-28 | Stop reason: HOSPADM

## 2022-07-28 RX ORDER — SODIUM CHLORIDE 0.9 % (FLUSH) 0.9 %
3 SYRINGE (ML) INJECTION EVERY 12 HOURS SCHEDULED
Status: DISCONTINUED | OUTPATIENT
Start: 2022-07-28 | End: 2022-07-28 | Stop reason: HOSPADM

## 2022-07-28 RX ORDER — LIDOCAINE HYDROCHLORIDE 10 MG/ML
0.5 INJECTION, SOLUTION EPIDURAL; INFILTRATION; INTRACAUDAL; PERINEURAL ONCE AS NEEDED
Status: DISCONTINUED | OUTPATIENT
Start: 2022-07-28 | End: 2022-07-28 | Stop reason: HOSPADM

## 2022-07-28 RX ORDER — DIPHENHYDRAMINE HCL 25 MG
25 CAPSULE ORAL
Status: DISCONTINUED | OUTPATIENT
Start: 2022-07-28 | End: 2022-07-28 | Stop reason: HOSPADM

## 2022-07-28 RX ORDER — PROPOFOL 10 MG/ML
VIAL (ML) INTRAVENOUS AS NEEDED
Status: DISCONTINUED | OUTPATIENT
Start: 2022-07-28 | End: 2022-07-28 | Stop reason: SURG

## 2022-07-28 RX ORDER — MAGNESIUM HYDROXIDE 1200 MG/15ML
LIQUID ORAL AS NEEDED
Status: DISCONTINUED | OUTPATIENT
Start: 2022-07-28 | End: 2022-07-28 | Stop reason: HOSPADM

## 2022-07-28 RX ORDER — DIAZEPAM 5 MG/1
5 TABLET ORAL ONCE
Status: COMPLETED | OUTPATIENT
Start: 2022-07-28 | End: 2022-07-28

## 2022-07-28 RX ORDER — LIDOCAINE HYDROCHLORIDE 10 MG/ML
3 INJECTION, SOLUTION INFILTRATION; PERINEURAL ONCE
Status: COMPLETED | OUTPATIENT
Start: 2022-07-28 | End: 2022-07-28

## 2022-07-28 RX ORDER — EPHEDRINE SULFATE 50 MG/ML
5 INJECTION, SOLUTION INTRAVENOUS ONCE AS NEEDED
Status: DISCONTINUED | OUTPATIENT
Start: 2022-07-28 | End: 2022-07-28 | Stop reason: HOSPADM

## 2022-07-28 RX ORDER — CLINDAMYCIN PHOSPHATE 900 MG/50ML
900 INJECTION INTRAVENOUS ONCE
Status: COMPLETED | OUTPATIENT
Start: 2022-07-28 | End: 2022-07-28

## 2022-07-28 RX ORDER — HYDRALAZINE HYDROCHLORIDE 20 MG/ML
5 INJECTION INTRAMUSCULAR; INTRAVENOUS
Status: DISCONTINUED | OUTPATIENT
Start: 2022-07-28 | End: 2022-07-28 | Stop reason: HOSPADM

## 2022-07-28 RX ORDER — ACETAMINOPHEN 500 MG
500 TABLET ORAL ONCE
Status: COMPLETED | OUTPATIENT
Start: 2022-07-28 | End: 2022-07-28

## 2022-07-28 RX ORDER — MIDAZOLAM HYDROCHLORIDE 1 MG/ML
0.5 INJECTION INTRAMUSCULAR; INTRAVENOUS
Status: DISCONTINUED | OUTPATIENT
Start: 2022-07-28 | End: 2022-07-28 | Stop reason: HOSPADM

## 2022-07-28 RX ORDER — LABETALOL HYDROCHLORIDE 5 MG/ML
5 INJECTION, SOLUTION INTRAVENOUS
Status: DISCONTINUED | OUTPATIENT
Start: 2022-07-28 | End: 2022-07-28 | Stop reason: HOSPADM

## 2022-07-28 RX ORDER — SODIUM CHLORIDE, SODIUM LACTATE, POTASSIUM CHLORIDE, CALCIUM CHLORIDE 600; 310; 30; 20 MG/100ML; MG/100ML; MG/100ML; MG/100ML
9 INJECTION, SOLUTION INTRAVENOUS CONTINUOUS
Status: DISCONTINUED | OUTPATIENT
Start: 2022-07-28 | End: 2022-07-28 | Stop reason: HOSPADM

## 2022-07-28 RX ORDER — ACETAMINOPHEN 325 MG/1
650 TABLET ORAL ONCE AS NEEDED
Status: DISCONTINUED | OUTPATIENT
Start: 2022-07-28 | End: 2022-07-28 | Stop reason: HOSPADM

## 2022-07-28 RX ORDER — FENTANYL CITRATE 50 UG/ML
INJECTION, SOLUTION INTRAMUSCULAR; INTRAVENOUS AS NEEDED
Status: DISCONTINUED | OUTPATIENT
Start: 2022-07-28 | End: 2022-07-28 | Stop reason: SURG

## 2022-07-28 RX ORDER — BUPIVACAINE HYDROCHLORIDE AND EPINEPHRINE 5; 5 MG/ML; UG/ML
INJECTION, SOLUTION PERINEURAL AS NEEDED
Status: DISCONTINUED | OUTPATIENT
Start: 2022-07-28 | End: 2022-07-28 | Stop reason: HOSPADM

## 2022-07-28 RX ORDER — ACETAMINOPHEN 650 MG/1
650 SUPPOSITORY RECTAL ONCE AS NEEDED
Status: DISCONTINUED | OUTPATIENT
Start: 2022-07-28 | End: 2022-07-28 | Stop reason: HOSPADM

## 2022-07-28 RX ORDER — ONDANSETRON 2 MG/ML
4 INJECTION INTRAMUSCULAR; INTRAVENOUS ONCE AS NEEDED
Status: DISCONTINUED | OUTPATIENT
Start: 2022-07-28 | End: 2022-07-28 | Stop reason: HOSPADM

## 2022-07-28 RX ORDER — FLUMAZENIL 0.1 MG/ML
0.2 INJECTION INTRAVENOUS AS NEEDED
Status: DISCONTINUED | OUTPATIENT
Start: 2022-07-28 | End: 2022-07-28 | Stop reason: HOSPADM

## 2022-07-28 RX ORDER — IBUPROFEN 600 MG/1
600 TABLET ORAL ONCE AS NEEDED
Status: DISCONTINUED | OUTPATIENT
Start: 2022-07-28 | End: 2022-07-28 | Stop reason: HOSPADM

## 2022-07-28 RX ADMIN — ACETAMINOPHEN 500 MG: 500 TABLET, FILM COATED ORAL at 09:48

## 2022-07-28 RX ADMIN — SODIUM CHLORIDE, POTASSIUM CHLORIDE, SODIUM LACTATE AND CALCIUM CHLORIDE 9 ML/HR: 600; 310; 30; 20 INJECTION, SOLUTION INTRAVENOUS at 11:48

## 2022-07-28 RX ADMIN — Medication 3 ML: at 11:20

## 2022-07-28 RX ADMIN — Medication 20 MG: at 12:13

## 2022-07-28 RX ADMIN — PROPOFOL 100 MCG/KG/MIN: 10 INJECTION, EMULSION INTRAVENOUS at 12:05

## 2022-07-28 RX ADMIN — CLINDAMYCIN IN 5 PERCENT DEXTROSE 900 MG: 18 INJECTION, SOLUTION INTRAVENOUS at 11:48

## 2022-07-28 RX ADMIN — FENTANYL CITRATE 25 MCG: 50 INJECTION INTRAMUSCULAR; INTRAVENOUS at 12:00

## 2022-07-28 RX ADMIN — Medication 50 MG: at 12:05

## 2022-07-28 RX ADMIN — DIAZEPAM 5 MG: 5 TABLET ORAL at 09:20

## 2022-07-28 NOTE — ANESTHESIA POSTPROCEDURE EVALUATION
Patient: Jessi Ruby    Procedure Summary     Date: 07/28/22 Room / Location:  [facility]    Anesthesia Start: 1153 Anesthesia Stop: 1248    Procedure: right breast wire localized excisional biopsy of radial scar (9:00, 2 cm from the nipple, tri-bell clip) (Right Breast) Diagnosis:       Radial scar of right breast      (Radial scar of right breast [N64.89])    Surgeons: Sera Quinones MD Provider: Antwon Mccabe MD    Anesthesia Type: general ASA Status: 2          Anesthesia Type: general    Vitals  Vitals Value Taken Time   /82 07/28/22 1305   Temp 36.4 °C (97.5 °F) 07/28/22 1252   Pulse 68 07/28/22 1305   Resp 16 07/28/22 1305   SpO2 98 % 07/28/22 1305           Post Anesthesia Care and Evaluation    Patient location during evaluation: bedside  Patient participation: complete - patient participated  Level of consciousness: awake and alert  Pain management: adequate    Airway patency: patent  Anesthetic complications: No anesthetic complications  PONV Status: controlled  Cardiovascular status: blood pressure returned to baseline and acceptable  Respiratory status: acceptable  Hydration status: acceptable

## 2022-07-28 NOTE — ANESTHESIA PREPROCEDURE EVALUATION
Anesthesia Evaluation     Patient summary reviewed and Nursing notes reviewed   no history of anesthetic complications:  NPO Solid Status: > 8 hours  NPO Liquid Status: > 2 hours           Airway   Mallampati: II  TM distance: >3 FB  Neck ROM: full  Dental      Pulmonary    Cardiovascular     ECG reviewed    (+) hypertension, hyperlipidemia,       Neuro/Psych  GI/Hepatic/Renal/Endo    (+) obesity,   thyroid problem hypothyroidism    Musculoskeletal     Abdominal    Substance History      OB/GYN          Other                        Anesthesia Plan    ASA 2     MAC     intravenous induction     Anesthetic plan, risks, benefits, and alternatives have been provided, discussed and informed consent has been obtained with: patient.        CODE STATUS:

## 2022-07-29 LAB
LAB AP CASE REPORT: NORMAL
LAB AP CLINICAL INFORMATION: NORMAL
LAB AP SPECIAL STAINS: NORMAL
PATH REPORT.FINAL DX SPEC: NORMAL
PATH REPORT.GROSS SPEC: NORMAL

## 2022-08-01 ENCOUNTER — TELEPHONE (OUTPATIENT)
Dept: SURGERY | Facility: CLINIC | Age: 72
End: 2022-08-01

## 2022-08-01 NOTE — TELEPHONE ENCOUNTER
Final Diagnosis   1. Breast, Right, Lumpectomy:  Breast tissue with                 A. Atypical lobular hyperplasia.               B. Tri-bell clip identified.                 C. Columnar cell change.               D. Biopsy site changes.               E. Microcalcifications and non-neoplastic tissue.               F.  Fibrous nodule.               G. Simple cyst.               H. Area consistent with portion of radial scar.               I.  Margins free of  in-situ and invasive disease.               J. Clustered apocrine cysts     2. Breast, Right, Additional Superior Margin, Excision:  Breast tissue with                A. Margins free of in-situ and invasive disease.               B. Microcalcifications and non-neoplastic tissue.               C. Atypical lobular hyperplasia.     Called regarding surgical pathology. Upgrade to ALH. Will follow from high risk screening. Follow up in 2 weeks.     Sera Quinones MD

## 2022-08-10 ENCOUNTER — OFFICE VISIT (OUTPATIENT)
Dept: SURGERY | Facility: CLINIC | Age: 72
End: 2022-08-10

## 2022-08-10 VITALS — HEIGHT: 63 IN | WEIGHT: 183.4 LBS | BODY MASS INDEX: 32.5 KG/M2

## 2022-08-10 DIAGNOSIS — N60.91 ATYPICAL LOBULAR HYPERPLASIA (ALH) OF RIGHT BREAST: Primary | ICD-10-CM

## 2022-08-10 PROCEDURE — 99024 POSTOP FOLLOW-UP VISIT: CPT | Performed by: STUDENT IN AN ORGANIZED HEALTH CARE EDUCATION/TRAINING PROGRAM

## 2022-08-10 NOTE — PROGRESS NOTES
GENERAL SURGERY BENIGN BREAST HISTORY AND PHYSICAL     SUMMARY:  Jessi Ruby is a 71 y.o. lady with:  A history of right breast radial scar/ALH.  -S/p right breast wire localized excisional biopsy 7/2022.  -Annual mammogram due 2/2023.   -Follow up 3/2023.   -Will plan for MRI after this in the setting of ALH found on final pathology.    High risk screening:   -Deshaun Vaz lifetime breast cancer risk 17%.     Referring Provider: No ref. provider found    Chief complaint: abnormal breast imaging    HPI: Ms. Jessi Ruby is seen at the request of No ref. provider found. The patient is a 71year old woman being seen for a new diagnosis of right breast radial scar.      This was initially detected as an imaging abnormality on routine screening. Her work-up is detailed in the breast history section below. She has had regular annual mammogram; she did miss one during COVID. She denies any prior history of abnormal mammograms or breast biopsies. She denies any breast lumps, pain, skin changes, or nipple discharge.    She denies any family history of breast or ovarian cancer.     8/10/2022 she presents today for follow-up.  Her pain is controlled.  She is getting back to her daily activities.  She has no concerns or complaints.    TIMELINE OF WORKUP:  2/8/2022 Bilateral Screening Mammogram   IMPRESSION/RECOMMENDATION(S):  Indeterminate right breast focal asymmetry with questioned distortion.  BI-RADS Category 0: Incomplete    3/15/2022 Right Breast Diagnostic Mammogram with US  With additional imaging there is partial persistence of the area of focal asymmetry in the posterior one third of the right breast  located lateral to the plane of the nipple. The area partially resolves on spot compression 90-degree lateral imaging but appears more dense on spot compression CC imaging. There are microcalcifications seen in the region that have become more coarse when compared to prior examinations and have not increased in  number or distribution when compared to prior  examinations.    ULTRASOUND: Targeted sonographic evaluation of the right breast was performed through the upper outer quadrant. At the 9 o'clock position on the order of 2 cm from the nipple there is a 0.9 x 0.4 x 0.3 cm ill-defined hypoechoic region surrounded by an area of increased echogenicity. No other abnormality is appreciated.  IMPRESSION:  1. There is a partially persistent area of asymmetry/density in the posterior one third of the right breast located lateral to the plane of the nipple. No sonographic correlate is appreciated. Correlation with a tomosynthesis guided right breast biopsy is recommended.  2. There is a 0.9 cm heterogenous ill-defined hypoechoic and hyperechoic region/lesion in the right breast at the 9 o'clock position on the order of 2 cm from the nipple. Correlation with an ultrasound-guided right breast biopsy is recommended.  BI-RADS CATEGORY 4: Suspicious abnormality or suspicious finding. Biopsy should be considered.    4/19/2022 Right Breast US Guided and Stereotactic Guided Biopsy:   The lesion at the 9-o'clock position in the posterior one-third was visualized. Multiple tissue specimens were obtained. A barbell shaped metallic clip was placed to franko the site.      Preliminary sonography of the right breast was performed. The lesion at the 9-o'clock position on the order of 2 cm from the nipple was visualized. Multiple tissue specimens were obtained. A tri bell-shaped metallic clip was placed to franko the site.      Postbiopsy digital CC and 90 unilateral images were obtained and demonstrate the barbell-shaped and tri bell-shaped metallic clips at the respective biopsy sites in the right breast. No evidence for clip migration or for hematoma is appreciated.     The pathology result from the 9-o'clock posterior one-third Tomosynthesis biopsy has returned as a radial scar. The pathology result from the 9-o'clock, 2 cm from nipple  ultrasound guided biopsy has returned as benign hyalinized breast stroma and ducts. Both are concordant with imaging findings.     4/19/2022 Pathology:   Final Diagnosis   1. Right Breast, 9 o'clock, Stereotatic Biopsies for Asymmetry:               A. Focal changes suggesting edge of benign radial scar.               B. No atypical hyperplasia, in situ nor invasive carcinoma identified.                 2. Right Breast, 9 o'clock, 2 cm from Nipple, Stereotactic Biopsies for Asymmetry:               A. Benign hyalinized breast stroma and ducts.               B. No atypical hyperplasia, in situ nor invasive carcinoma identified.     7/28/2022 Right breast wire localized excisional biopsy  Final Diagnosis   1. Breast, Right, Lumpectomy:  Breast tissue with                 A. Atypical lobular hyperplasia.               B. Tri-bell clip identified.                 C. Columnar cell change.               D. Biopsy site changes.               E. Microcalcifications and non-neoplastic tissue.               F.  Fibrous nodule.               G. Simple cyst.               H. Area consistent with portion of radial scar.               I.  Margins free of  in-situ and invasive disease.               J. Clustered apocrine cysts     2. Breast, Right, Additional Superior Margin, Excision:  Breast tissue with                A. Margins free of in-situ and invasive disease.               B. Microcalcifications and non-neoplastic tissue.               C. Atypical lobular hyperplasia.     MEDICAL HISTORY:   Gynecologic History:   G:3. P:3 AB:0  Age at first childbirth: 27  Lactation/How long: x3  Age at menarche: 12  Age at menopause: 50s  Total years of oral contraceptive use: Previously  Total years of hormone replacement therapy: None    Past Medical History:   • Hypothyroidism   • HTN  • HLD    Past Surgical History:    • Endometrial ablation  • Bilateral knee surgery     Family History:    • As above     Social History:   • Denies  tobacco use, quit a long time ago, socially smokes sometimes  • Occasional alcohol use    Allergies:   Allergies   Allergen Reactions   • Penicillins Anaphylaxis   • Morphine Nausea And Vomiting       Medications:     Current Outpatient Medications:   •  atorvastatin (LIPITOR) 40 MG tablet, Take 1 tablet by mouth Daily. (Patient taking differently: Take 40 mg by mouth Every Night.), Disp: 90 tablet, Rfl: 3  •  levothyroxine (SYNTHROID, LEVOTHROID) 100 MCG tablet, Take 1 tablet by mouth Daily., Disp: 90 tablet, Rfl: 1  •  losartan (COZAAR) 100 MG tablet, Take 1 tablet by mouth Daily., Disp: 90 tablet, Rfl: 3  •  clindamycin (CLEOCIN) 300 MG capsule, As needed when has a dental appt (Patient taking differently: Take As Directed. As needed when has a dental appt), Disp: 10 capsule, Rfl: 0    Labs:    Labs from 7/26/2022 reviewed    ROS:   Influenza-like illness: no fever, no  cough, no  sore throat, no  body aches, no loss of sense of taste or smell, no known exposure to person with Covid-19.  Constitutional: Negative for fevers or chills  HENT: Negative for hearing loss or runny nose  Eyes: Negative for vision changes or scleral icterus  Respiratory: Negative for cough or shortness of breath  Cardiovascular: Negative for chest pain or heart palpitations  Gastrointestinal: Negative for abdominal pain, nausea, vomiting, constipation, melena, or hematochezia  Genitourinary: Negative for hematuria or dysuria  Musculoskeletal: Negative for joint swelling or gait instability  Neurologic: Negative for tremors or seizures  Psychiatric: Negative for suicidal ideations or depression  All other systems reviewed and negative    PHYSICAL EXAM:   • Constitutional: Well-developed well-nourished, no acute distress  • Eyes: Conjunctiva normal, sclera nonicteric  • ENMT: Hearing grossly normal, oral mucosa moist  • Neck: Supple, trachea midline  • Respiratory: No increased work of breathing, normal inspiratory effort  • Cardiovascular:  Regular rate, no murmur, no peripheral edema, no jugular venous distention  • Breast: symmetric  o Right: No visible abnormalities on inspection while seated, with arms raised or hands on hips. No masses, skin changes, or nipple abnormalities.  Right breast periareolar incision clean and dry with no erythema or drainage.  o Left: No visible abnormalities on inspection while seated, with arms raised or hands on hips. No masses, skin changes, or nipple abnormalities.   o No clinical chest wall involvement.  • Gastrointestinal: Soft, nontender  • Lymphatics (palpable nodes): No cervical, supraclavicular or axillary lymphadenopathy  • Skin:  Warm, dry, no rash on visualized skin surfaces  • Musculoskeletal: Symmetric strength, normal gait  • Psychiatric: Alert and oriented ×3, normal affect     REBECCA RASMUSSEN M.D.  General and Endoscopic Surgery  [facility]    [address], [address]  [place], [place], [address]  P: [phone]  F: [phone]

## 2022-08-30 ENCOUNTER — OFFICE VISIT (OUTPATIENT)
Dept: FAMILY MEDICINE CLINIC | Facility: CLINIC | Age: 72
End: 2022-08-30

## 2022-08-30 VITALS
DIASTOLIC BLOOD PRESSURE: 82 MMHG | HEART RATE: 74 BPM | WEIGHT: 184.4 LBS | SYSTOLIC BLOOD PRESSURE: 140 MMHG | OXYGEN SATURATION: 99 % | TEMPERATURE: 98.4 F | HEIGHT: 63 IN | BODY MASS INDEX: 32.67 KG/M2

## 2022-08-30 DIAGNOSIS — M81.0 AGE-RELATED OSTEOPOROSIS WITHOUT CURRENT PATHOLOGICAL FRACTURE: ICD-10-CM

## 2022-08-30 DIAGNOSIS — Z00.00 ENCOUNTER FOR HEALTH MAINTENANCE EXAMINATION: ICD-10-CM

## 2022-08-30 DIAGNOSIS — E03.9 ADULT HYPOTHYROIDISM: ICD-10-CM

## 2022-08-30 DIAGNOSIS — E78.5 HYPERLIPIDEMIA, UNSPECIFIED HYPERLIPIDEMIA TYPE: ICD-10-CM

## 2022-08-30 DIAGNOSIS — I10 PRIMARY HYPERTENSION: Primary | ICD-10-CM

## 2022-08-30 PROCEDURE — 90471 IMMUNIZATION ADMIN: CPT | Performed by: STUDENT IN AN ORGANIZED HEALTH CARE EDUCATION/TRAINING PROGRAM

## 2022-08-30 PROCEDURE — 90715 TDAP VACCINE 7 YRS/> IM: CPT | Performed by: STUDENT IN AN ORGANIZED HEALTH CARE EDUCATION/TRAINING PROGRAM

## 2022-08-30 PROCEDURE — 99214 OFFICE O/P EST MOD 30 MIN: CPT | Performed by: STUDENT IN AN ORGANIZED HEALTH CARE EDUCATION/TRAINING PROGRAM

## 2022-08-30 NOTE — PROGRESS NOTES
"Chief Complaint  Hypertension (New patient establish care), Hyperlipidemia, and Hypothyroidism    Subjective        Jessi Ruby presents to [facility]  71-year-old woman with hypertension, hyperlipidemia who presents for follow-up.    Currently on losartan for hypertension.  Doing well with home readings less than 140/90 consistently.  Asymptomatic.      Objective   Vital Signs:  /82 (BP Location: Left arm, Patient Position: Sitting, Cuff Size: Adult)   Pulse 74   Temp 98.4 °F (36.9 °C)   Ht 160 cm (63\")   Wt 83.6 kg (184 lb 6.4 oz)   SpO2 99%   BMI 32.66 kg/m²   Estimated body mass index is 32.66 kg/m² as calculated from the following:    Height as of this encounter: 160 cm (63\").    Weight as of this encounter: 83.6 kg (184 lb 6.4 oz).    BMI is >= 30 and <35. (Class 1 Obesity). The following options were offered after discussion;: exercise counseling/recommendations and nutrition counseling/recommendations      Physical Exam  Constitutional:       General: She is not in acute distress.  Eyes:      Conjunctiva/sclera: Conjunctivae normal.   Cardiovascular:      Rate and Rhythm: Normal rate and regular rhythm.   Pulmonary:      Effort: Pulmonary effort is normal. No respiratory distress.      Breath sounds: Normal breath sounds.   Skin:     General: Skin is warm and dry.   Neurological:      Mental Status: She is alert and oriented to person, place, and time.   Psychiatric:         Mood and Affect: Mood normal.         Behavior: Behavior normal.        Result Review :  The following data was reviewed by: Ritika Chang MD on 08/30/2022:  Common labs    Common Labsle 1/24/22 1/24/22 1/24/22 7/26/22 7/26/22    1057 1057 1057 1123 1123   Glucose  89   104 (A)   BUN  14   12   Creatinine  0.70   0.62   eGFR Non  Am  87      eGFR African Am  100      Sodium  141   139   Potassium  4.4   4.4   Chloride  103   102   Calcium  9.8   9.4   Total Protein  6.9      Albumin  " 4.5      Total Bilirubin  0.7      Alkaline Phosphatase  93      AST (SGOT)  28      ALT (SGPT)  36 (A)      WBC 7.1   6.59    Hemoglobin 14.1   14.0    Hematocrit 41.2   40.0    Platelets 238   208    Total Cholesterol   152     Triglycerides   208 (A)     HDL Cholesterol   43     LDL Cholesterol    74     (A) Abnormal value       Comments are available for some flowsheets but are not being displayed.           Data reviewed: none          Assessment and Plan   Diagnoses and all orders for this visit:    1. Primary hypertension (Primary)  Assessment & Plan:  Well-controlled on losartan 100 mg daily.  Home readings consistently less than 140/90.  Continue with losartan 100 mg daily.  Goal less than 140/90.      2. Encounter for health maintenance examination  -     Tdap Vaccine Greater Than or Equal To 6yo IM    3. Age-related osteoporosis without current pathological fracture   -     DEXA Bone Density Axial; Future    4. Hyperlipidemia, unspecified hyperlipidemia type  Assessment & Plan:  Well-controlled on atorvastatin 40 mg daily.  Continue current therapy.  Repeat lipid panel yearly.        5. Adult hypothyroidism  Assessment & Plan:  Patient's dose increased from 88 mcg to 100 mcg due to low TSH.  On repeat TSH was within normal limits on current dose.  Continue 100 mcg of levothyroxine daily.  Repeat TSH at next visit.           I spent 34 minutes caring for Jessi on this date of service. This time includes time spent by me in the following activities:preparing for the visit, performing a medically appropriate examination and/or evaluation , ordering medications, tests, or procedures and documenting information in the medical record  Follow Up   Return in about 6 months (around 2/28/2023) for Annual physical.  Patient was given instructions and counseling regarding her condition or for health maintenance advice. Please see specific information pulled into the AVS if appropriate.

## 2022-08-30 NOTE — ASSESSMENT & PLAN NOTE
Patient's dose increased from 88 mcg to 100 mcg due to low TSH.  On repeat TSH was within normal limits on current dose.  Continue 100 mcg of levothyroxine daily.  Repeat TSH at next visit.

## 2022-08-30 NOTE — ASSESSMENT & PLAN NOTE
Well-controlled on losartan 100 mg daily.  Home readings consistently less than 140/90.  Continue with losartan 100 mg daily.  Goal less than 140/90.

## 2022-08-30 NOTE — ASSESSMENT & PLAN NOTE
Well-controlled on atorvastatin 40 mg daily.  Continue current therapy.  Repeat lipid panel yearly.

## 2022-09-15 RX ORDER — LEVOTHYROXINE SODIUM 0.1 MG/1
TABLET ORAL
Qty: 90 TABLET | Refills: 1 | Status: SHIPPED | OUTPATIENT
Start: 2022-09-15 | End: 2023-03-01 | Stop reason: DRUGHIGH

## 2022-12-21 ENCOUNTER — HOSPITAL ENCOUNTER (OUTPATIENT)
Dept: BONE DENSITY | Facility: HOSPITAL | Age: 72
Discharge: HOME | End: 2022-12-21
Admitting: STUDENT IN AN ORGANIZED HEALTH CARE EDUCATION/TRAINING PROGRAM

## 2022-12-21 DIAGNOSIS — M81.0 AGE-RELATED OSTEOPOROSIS WITHOUT CURRENT PATHOLOGICAL FRACTURE: ICD-10-CM

## 2022-12-21 PROCEDURE — 77080 DXA BONE DENSITY AXIAL: CPT

## 2023-01-17 ENCOUNTER — TELEPHONE (OUTPATIENT)
Dept: FAMILY MEDICINE CLINIC | Facility: CLINIC | Age: 73
End: 2023-01-17

## 2023-01-17 DIAGNOSIS — I10 PRIMARY HYPERTENSION: ICD-10-CM

## 2023-01-17 RX ORDER — LOSARTAN POTASSIUM 100 MG/1
TABLET ORAL
Qty: 90 TABLET | Refills: 3 | Status: SHIPPED | OUTPATIENT
Start: 2023-01-17

## 2023-01-17 NOTE — TELEPHONE ENCOUNTER
Caller: Jessi Ruby    Relationship: Self    Best call back number: [phone]     Caller requesting test results:     What test was performed: DEXA SCAN    When was the test performed: 12/21/22    [facility]     Additional notes: PATIENT CALLING WANTING TO KNOW IF THE TEST RESULTS ARE BACK YET

## 2023-01-28 DIAGNOSIS — E78.5 HYPERLIPIDEMIA, UNSPECIFIED HYPERLIPIDEMIA TYPE: ICD-10-CM

## 2023-01-30 RX ORDER — ATORVASTATIN CALCIUM 40 MG/1
40 TABLET, FILM COATED ORAL NIGHTLY
Qty: 90 TABLET | Refills: 3 | Status: SHIPPED | OUTPATIENT
Start: 2023-01-30

## 2023-02-28 ENCOUNTER — OFFICE VISIT (OUTPATIENT)
Dept: FAMILY MEDICINE CLINIC | Facility: CLINIC | Age: 73
End: 2023-02-28
Payer: MEDICARE

## 2023-02-28 VITALS
HEART RATE: 85 BPM | OXYGEN SATURATION: 98 % | BODY MASS INDEX: 31.01 KG/M2 | TEMPERATURE: 97.7 F | SYSTOLIC BLOOD PRESSURE: 116 MMHG | WEIGHT: 175 LBS | HEIGHT: 63 IN | DIASTOLIC BLOOD PRESSURE: 70 MMHG

## 2023-02-28 DIAGNOSIS — M85.80 OSTEOPENIA, UNSPECIFIED LOCATION: ICD-10-CM

## 2023-02-28 DIAGNOSIS — E55.9 VITAMIN D DEFICIENCY: ICD-10-CM

## 2023-02-28 DIAGNOSIS — Z13.6 SCREENING FOR ISCHEMIC HEART DISEASE: ICD-10-CM

## 2023-02-28 DIAGNOSIS — E03.9 ADULT HYPOTHYROIDISM: ICD-10-CM

## 2023-02-28 DIAGNOSIS — Z00.00 MEDICARE ANNUAL WELLNESS VISIT, SUBSEQUENT: Primary | ICD-10-CM

## 2023-02-28 DIAGNOSIS — N32.81 OVERACTIVE BLADDER: ICD-10-CM

## 2023-02-28 PROCEDURE — 1159F MED LIST DOCD IN RCRD: CPT | Performed by: STUDENT IN AN ORGANIZED HEALTH CARE EDUCATION/TRAINING PROGRAM

## 2023-02-28 PROCEDURE — 99214 OFFICE O/P EST MOD 30 MIN: CPT | Performed by: STUDENT IN AN ORGANIZED HEALTH CARE EDUCATION/TRAINING PROGRAM

## 2023-02-28 PROCEDURE — G0439 PPPS, SUBSEQ VISIT: HCPCS | Performed by: STUDENT IN AN ORGANIZED HEALTH CARE EDUCATION/TRAINING PROGRAM

## 2023-02-28 PROCEDURE — 1126F AMNT PAIN NOTED NONE PRSNT: CPT | Performed by: STUDENT IN AN ORGANIZED HEALTH CARE EDUCATION/TRAINING PROGRAM

## 2023-02-28 PROCEDURE — 1170F FXNL STATUS ASSESSED: CPT | Performed by: STUDENT IN AN ORGANIZED HEALTH CARE EDUCATION/TRAINING PROGRAM

## 2023-02-28 RX ORDER — OXYBUTYNIN CHLORIDE 10 MG/1
10 TABLET, EXTENDED RELEASE ORAL DAILY
Qty: 90 TABLET | Refills: 0 | Status: SHIPPED | OUTPATIENT
Start: 2023-02-28 | End: 2023-03-14

## 2023-02-28 NOTE — PROGRESS NOTES
The ABCs of the Annual Wellness Visit  Subsequent Medicare Wellness Visit    Subjective    Jessi Ruby is a 72 y.o. female who presents for a Subsequent Medicare Wellness Visit.    The following portions of the patient's history were reviewed and   updated as appropriate: allergies, current medications, past family history, past medical history, past social history, past surgical history and problem list.    Compared to one year ago, the patient feels her physical   health is the same.    Compared to one year ago, the patient feels her mental   health is the same.    Recent Hospitalizations:  She was not admitted to the hospital during the last year.       Current Medical Providers:  Patient Care Team:  Ritika Chang MD as PCP - General (Family Medicine)    Outpatient Medications Prior to Visit   Medication Sig Dispense Refill   • atorvastatin (LIPITOR) 40 MG tablet Take 1 tablet by mouth Every Night. 90 tablet 3   • CALCIUM PO Take 1,000 mg by mouth. Patient takes 2 a day     • clindamycin (CLEOCIN) 300 MG capsule As needed when has a dental appt (Patient taking differently: Take As Directed. As needed when has a dental appt) 10 capsule 0   • levothyroxine (SYNTHROID, LEVOTHROID) 100 MCG tablet TAKE 1 TABLET BY MOUTH EVERY DAY 90 tablet 1   • losartan (COZAAR) 100 MG tablet TAKE 1 TABLET BY MOUTH EVERY DAY 90 tablet 3     No facility-administered medications prior to visit.       No opioid medication identified on active medication list. I have reviewed chart for other potential  high risk medication/s and harmful drug interactions in the elderly.          Aspirin is not on active medication list.  Aspirin use is not indicated based on review of current medical condition/s. Risk of harm outweighs potential benefits.  .    Patient Active Problem List   Diagnosis   • Hyperlipidemia   • Hypertension   • Adult hypothyroidism   • Cannot sleep   • Breast cancer screening   • At risk for dental problems   • Colon  "cancer screening   • Pap test, as part of routine gynecological examination   • Acute pain of right shoulder   • Radial scar of right breast   • S/P knee replacement   • Medicare annual wellness visit, subsequent     Advance Care Planning  Advance Directive is not on file.  ACP discussion was held with the patient during this visit. Patient has an advance directive (not in EMR), copy requested.     Objective    Vitals:    02/28/23 0958   BP: 116/70   BP Location: Right arm   Patient Position: Sitting   Cuff Size: Adult   Pulse: 85   Temp: 97.7 °F (36.5 °C)   TempSrc: Infrared   SpO2: 98%   Weight: 79.4 kg (175 lb)   Height: 160 cm (63\")   PainSc: 0-No pain     Estimated body mass index is 31 kg/m² as calculated from the following:    Height as of this encounter: 160 cm (63\").    Weight as of this encounter: 79.4 kg (175 lb).    BMI is >= 30 and <35. (Class 1 Obesity). The following options were offered after discussion;: exercise counseling/recommendations and nutrition counseling/recommendations      Does the patient have evidence of cognitive impairment? No          HEALTH RISK ASSESSMENT    Smoking Status:  Social History     Tobacco Use   Smoking Status Some Days   • Packs/day: 0.25   • Types: Cigarettes   • Start date: 1970   • Passive exposure: Current   Smokeless Tobacco Never   Tobacco Comments    SOCIAL WITH WINE     Alcohol Consumption:  Social History     Substance and Sexual Activity   Alcohol Use Yes   • Alcohol/week: 2.0 - 4.0 standard drinks   • Types: 2 - 4 Glasses of wine per week    Comment: SOCIAL     Fall Risk Screen:    ERISADI Fall Risk Assessment was completed, and patient is at LOW risk for falls.Assessment completed on:2/28/2023    Depression Screening:  PHQ-2/PHQ-9 Depression Screening 2/28/2023   Little Interest or Pleasure in Doing Things 0-->not at all   Feeling Down, Depressed or Hopeless 0-->not at all   PHQ-9: Brief Depression Severity Measure Score 0       Health Habits and Functional " and Cognitive Screening:  Functional & Cognitive Status 2/28/2023   Do you have difficulty preparing food and eating? No   Do you have difficulty bathing yourself, getting dressed or grooming yourself? No   Do you have difficulty using the toilet? No   Do you have difficulty moving around from place to place? No   Do you have trouble with steps or getting out of a bed or a chair? No   Current Diet Well Balanced Diet   Dental Exam Up to date   Eye Exam Up to date   Exercise (times per week) 7 times per week   Current Exercises Include Walking        Exercise Comment 6 or 7 miles a day    Do you need help using the phone?  No   Are you deaf or do you have serious difficulty hearing?  No   Do you need help with transportation? No   Do you need help shopping? No   Do you need help preparing meals?  No   Do you need help with housework?  No   Do you need help with laundry? No   Do you need help taking your medications? No   Do you need help managing money? No   Do you ever drive or ride in a car without wearing a seat belt? No   Have you felt unusual stress, anger or loneliness in the last month? No   Who do you live with? Spouse   If you need help, do you have trouble finding someone available to you? No   Have you been bothered in the last four weeks by sexual problems? No   Do you have difficulty concentrating, remembering or making decisions? No       Age-appropriate Screening Schedule:  Refer to the list below for future screening recommendations based on patient's age, sex and/or medical conditions. Orders for these recommended tests are listed in the plan section. The patient has been provided with a written plan.    Health Maintenance   Topic Date Due   • COLORECTAL CANCER SCREENING  Never done   • ZOSTER VACCINE (1 of 2) Never done   • ANNUAL WELLNESS VISIT  02/28/2024   • LIPID PANEL  02/28/2024   • MAMMOGRAM  03/15/2024   • DXA SCAN  12/21/2024   • TDAP/TD VACCINES (2 - Td or Tdap) 08/30/2032   • HEPATITIS C  "SCREENING  Completed   • COVID-19 Vaccine  Completed   • INFLUENZA VACCINE  Completed   • Pneumococcal Vaccine 65+  Completed                CMS Preventative Services Quick Reference  Risk Factors Identified During Encounter  Immunizations Discussed/Encouraged: Shingrix  Dental Screening Recommended  Vision Screening Recommended  The above risks/problems have been discussed with the patient.  Pertinent information has been shared with the patient in the After Visit Summary.  An After Visit Summary and PPPS were made available to the patient.    Follow Up:   Next Medicare Wellness visit to be scheduled in 1 year.       Additional E&M Note during same encounter follows:  Patient has multiple medical problems which are significant and separately identifiable that require additional work above and beyond the Medicare Wellness Visit.      Chief Complaint  Medicare Wellness-subsequent (Pt requested full set of labs (fasting))    Subjective        HPI  Jessi Ruby is also being seen today for urinary incontinence. She has had to wear a feminine pad for many years due to urinary leaking throughout the day. This has gotten worse recently and she is having to change her pad 3-4x per day. She does not have obvious organ prolapse that she is aware of. Has never tried medication in the past.          Objective   Vital Signs:  /70 (BP Location: Right arm, Patient Position: Sitting, Cuff Size: Adult)   Pulse 85   Temp 97.7 °F (36.5 °C) (Infrared)   Ht 160 cm (63\")   Wt 79.4 kg (175 lb)   SpO2 98%   BMI 31.00 kg/m²     Physical Exam  Constitutional:       General: She is not in acute distress.  Eyes:      Conjunctiva/sclera: Conjunctivae normal.   Cardiovascular:      Rate and Rhythm: Normal rate and regular rhythm.   Pulmonary:      Effort: Pulmonary effort is normal. No respiratory distress.      Breath sounds: Normal breath sounds.   Skin:     General: Skin is warm and dry.   Neurological:      Mental Status: She " is alert and oriented to person, place, and time.   Psychiatric:         Mood and Affect: Mood normal.         Behavior: Behavior normal.          The following data was reviewed by: Ritika Chang MD on 02/28/2023:  Common labs    Common Labs 7/26/22 7/26/22    1123 1123   Glucose  104 (A)   BUN  12   Creatinine  0.62   Sodium  139   Potassium  4.4   Chloride  102   Calcium  9.4   WBC 6.59    Hemoglobin 14.0    Hematocrit 40.0    Platelets 208    (A) Abnormal value            Data reviewed: none           Assessment and Plan   Diagnoses and all orders for this visit:    1. Medicare annual wellness visit, subsequent (Primary)  Assessment & Plan:  Colonoscopy: defers today  Cervical Cancer Screening: aged out  Mammogram:UTD; repeat 1-2yrs  LDCT: never smoker  DEXA: last 12/2021 with osteopenia    Immunizations: eligible for Shingrex  Labs: ordered today, on statin  The 10-year ASCVD risk score (Werner DIANA, et al., 2019) is: 20.6%    Values used to calculate the score:      Age: 72 years      Sex: Female      Is Non- : No      Diabetic: No      Tobacco smoker: Yes      Systolic Blood Pressure: 116 mmHg      Is BP treated: Yes      HDL Cholesterol: 43 mg/dL      Total Cholesterol: 152 mg/dL        Patient was counseled in regards to maintaining a healthy lifestyle, rich in whole grains, fruits and vegetables. Limit high saturated fats and processed sugars. Maintain an active lifestyle to promote overall health and well being.         2. Osteopenia, unspecified location  -     Vitamin D,25-Hydroxy    3. Screening for ischemic heart disease  -     Comprehensive Metabolic Panel  -     Lipid Panel    4. Vitamin D deficiency  -     Vitamin D,25-Hydroxy    5. Adult hypothyroidism  -     TSH    6. Overactive bladder  -     oxybutynin XL (DITROPAN-XL) 10 MG 24 hr tablet; Take 1 tablet by mouth Daily.  Dispense: 90 tablet; Refill: 0         Follow Up   No follow-ups on file.  Patient was given  instructions and counseling regarding her condition or for health maintenance advice. Please see specific information pulled into the AVS if appropriate.

## 2023-02-28 NOTE — ASSESSMENT & PLAN NOTE
Colonoscopy: defers today  Cervical Cancer Screening: aged out  Mammogram:UTD; repeat 1-2yrs  LDCT: never smoker  DEXA: last 12/2021 with osteopenia    Immunizations: eligible for Shingrex  Labs: ordered today, on statin  The 10-year ASCVD risk score (Werner DIANA, et al., 2019) is: 20.6%    Values used to calculate the score:      Age: 72 years      Sex: Female      Is Non- : No      Diabetic: No      Tobacco smoker: Yes      Systolic Blood Pressure: 116 mmHg      Is BP treated: Yes      HDL Cholesterol: 43 mg/dL      Total Cholesterol: 152 mg/dL        Patient was counseled in regards to maintaining a healthy lifestyle, rich in whole grains, fruits and vegetables. Limit high saturated fats and processed sugars. Maintain an active lifestyle to promote overall health and well being.

## 2023-03-01 DIAGNOSIS — E03.9 ADULT HYPOTHYROIDISM: Primary | ICD-10-CM

## 2023-03-01 DIAGNOSIS — M81.0 HIGH RISK FOR FRACTURE DUE TO OSTEOPOROSIS BY DEXA SCAN: Primary | ICD-10-CM

## 2023-03-01 LAB
25(OH)D3+25(OH)D2 SERPL-MCNC: 40.6 NG/ML (ref 30–100)
ALBUMIN SERPL-MCNC: 4.9 G/DL (ref 3.5–5.2)
ALBUMIN/GLOB SERPL: 2.3 G/DL
ALP SERPL-CCNC: 96 U/L (ref 39–117)
ALT SERPL-CCNC: 25 U/L (ref 1–33)
AST SERPL-CCNC: 17 U/L (ref 1–32)
BILIRUB SERPL-MCNC: 0.8 MG/DL (ref 0–1.2)
BUN SERPL-MCNC: 11 MG/DL (ref 8–23)
BUN/CREAT SERPL: 15.9 (ref 7–25)
CALCIUM SERPL-MCNC: 10.1 MG/DL (ref 8.6–10.5)
CHLORIDE SERPL-SCNC: 104 MMOL/L (ref 98–107)
CHOLEST SERPL-MCNC: 173 MG/DL (ref 0–200)
CO2 SERPL-SCNC: 27.8 MMOL/L (ref 22–29)
CREAT SERPL-MCNC: 0.69 MG/DL (ref 0.57–1)
EGFRCR SERPLBLD CKD-EPI 2021: 91.8 ML/MIN/1.73
GLOBULIN SER CALC-MCNC: 2.1 GM/DL
GLUCOSE SERPL-MCNC: 105 MG/DL (ref 65–99)
HDLC SERPL-MCNC: 41 MG/DL (ref 40–60)
LDLC SERPL CALC-MCNC: 86 MG/DL (ref 0–100)
POTASSIUM SERPL-SCNC: 4.7 MMOL/L (ref 3.5–5.2)
PROT SERPL-MCNC: 7 G/DL (ref 6–8.5)
SODIUM SERPL-SCNC: 142 MMOL/L (ref 136–145)
TRIGL SERPL-MCNC: 277 MG/DL (ref 0–150)
TSH SERPL DL<=0.005 MIU/L-ACNC: 0.18 UIU/ML (ref 0.27–4.2)
VLDLC SERPL CALC-MCNC: 46 MG/DL (ref 5–40)

## 2023-03-01 RX ORDER — ALENDRONATE SODIUM 70 MG/1
70 TABLET ORAL
Qty: 12 TABLET | Refills: 3 | Status: SHIPPED | OUTPATIENT
Start: 2023-03-01

## 2023-03-01 RX ORDER — LEVOTHYROXINE SODIUM 88 UG/1
88 TABLET ORAL DAILY
Qty: 60 TABLET | Refills: 0 | Status: SHIPPED | OUTPATIENT
Start: 2023-03-01

## 2023-03-12 NOTE — PROGRESS NOTES
GENERAL SURGERY BENIGN BREAST HISTORY AND PHYSICAL     SUMMARY:  Jessi Ruby is a 72 y.o. lady with:    (1) History of right breast radial scar and ALH:  - S/p right breast wire localized excisional biopsy 7/2022.    (2) High risk screening for breast cancer:  - Deshaun Vaz lifetime breast cancer risk 16%. Karen Model lifetime risk 10.7%  - Annual mammogram due 2/2023. She has not completed this. Order placed.  - Will plan for MRI 09/2023. Order placed.   - Referral placed to medical oncology for evaluation for prophylactic endocrine therapy.   - Follow up 4/2023.     Chief complaint: abnormal breast imaging    HPI: Ms. Jessi Ruby is seen at the request of No ref. provider found. The patient is a 71year old woman being seen for a new diagnosis of right breast radial scar.    This was initially detected as an imaging abnormality on routine screening. Her work-up is detailed in the breast history section below. She has had regular annual mammogram; she did miss one during COVID. She denies any prior history of abnormal mammograms or breast biopsies. She denies any breast lumps, pain, skin changes, or nipple discharge. She denies any family history of breast or ovarian cancer.     8/10/2022 She presents today for follow-up.  Her pain is controlled.  She is getting back to her daily activities.  She has no concerns or complaints.    3/14/2023 She is here today for a follow-up. She denies any breast concerns, denies any masses or skin changes. She has not had her annual mammogram yet.     TIMELINE OF WORKUP:  2/8/2022 Bilateral Screening Mammogram   IMPRESSION/RECOMMENDATION(S): Indeterminate right breast focal asymmetry with questioned distortion.  BI-RADS Category 0: Incomplete    3/15/2022 Right Breast Diagnostic Mammogram with US  With additional imaging there is partial persistence of the area of focal asymmetry in the posterior one third of the right breast located lateral to the plane of the nipple. The area  partially resolves on spot compression 90-degree lateral imaging but appears more dense on spot compression CC imaging. There are microcalcifications seen in the region that have become more coarse when compared to prior examinations and have not increased in number or distribution when compared to prior  examinations.    ULTRASOUND: Targeted sonographic evaluation of the right breast was performed through the upper outer quadrant. At the 9 o'clock position on the order of 2 cm from the nipple there is a 0.9 x 0.4 x 0.3 cm ill-defined hypoechoic region surrounded by an area of increased echogenicity. No other abnormality is appreciated.  IMPRESSION:  1. There is a partially persistent area of asymmetry/density in the posterior one third of the right breast located lateral to the plane of the nipple. No sonographic correlate is appreciated. Correlation with a tomosynthesis guided right breast biopsy is recommended.  2. There is a 0.9 cm heterogenous ill-defined hypoechoic and hyperechoic region/lesion in the right breast at the 9 o'clock position on the order of 2 cm from the nipple. Correlation with an ultrasound-guided right breast biopsy is recommended.  BI-RADS CATEGORY 4: Suspicious abnormality or suspicious finding. Biopsy should be considered.    4/19/2022 Right Breast US Guided and Stereotactic Guided Biopsy:   The lesion at the 9-o'clock position in the posterior one-third was visualized. Multiple tissue specimens were obtained. A barbell shaped metallic clip was placed to franko the site.   Preliminary sonography of the right breast was performed. The lesion at the 9-o'clock position on the order of 2 cm from the nipple was visualized. Multiple tissue specimens were obtained. A tri bell-shaped metallic clip was placed to franko the site.   Postbiopsy digital CC and 90 unilateral images were obtained and demonstrate the barbell-shaped and tri bell-shaped metallic clips at the respective biopsy sites in the right  breast. No evidence for clip migration or for hematoma is appreciated.  The pathology result from the 9-o'clock posterior one-third Tomosynthesis biopsy has returned as a radial scar. The pathology result from the 9-o'clock, 2 cm from nipple ultrasound guided biopsy has returned as benign hyalinized breast stroma and ducts. Both are concordant with imaging findings.     4/19/2022 Pathology:   Final Diagnosis   1. Right Breast, 9 o'clock, Stereotatic Biopsies for Asymmetry:               A. Focal changes suggesting edge of benign radial scar.               B. No atypical hyperplasia, in situ nor invasive carcinoma identified.  2. Right Breast, 9 o'clock, 2 cm from Nipple, Stereotactic Biopsies for Asymmetry:               A. Benign hyalinized breast stroma and ducts.               B. No atypical hyperplasia, in situ nor invasive carcinoma identified.     7/28/2022 Right breast wire localized excisional biopsy  Final Diagnosis   1. Breast, Right, Lumpectomy:  Breast tissue with                 A. Atypical lobular hyperplasia.               B. Tri-bell clip identified.                 C. Columnar cell change.               D. Biopsy site changes.               E. Microcalcifications and non-neoplastic tissue.               F.  Fibrous nodule.               G. Simple cyst.               H. Area consistent with portion of radial scar.               I.  Margins free of in-situ and invasive disease.               J. Clustered apocrine cysts  2. Breast, Right, Additional Superior Margin, Excision:  Breast tissue with                A. Margins free of in-situ and invasive disease.               B. Microcalcifications and non-neoplastic tissue.               C. Atypical lobular hyperplasia.       MEDICAL HISTORY:   Gynecologic History:   G:3. P:3 AB:0  Age at first childbirth: 27  Lactation/How long: x3  Age at menarche: 12  Age at menopause: 50s  Total years of oral contraceptive use: Previously  Total years of hormone  replacement therapy: None    Past Medical History:   • Hypothyroidism   • HTN  • HLD    Past Surgical History:    • Endometrial ablation  • Bilateral knee surgery     Family History:    • As above     Social History:   • Denies tobacco use, quit a long time ago, socially smokes sometimes  • Occasional alcohol use    Allergies:   Allergies   Allergen Reactions   • Penicillins Anaphylaxis   • Morphine Nausea And Vomiting     Medications:     Current Outpatient Medications:   •  alendronate (Fosamax) 70 MG tablet, Take 1 tablet by mouth Every 7 (Seven) Days., Disp: 12 tablet, Rfl: 3  •  atorvastatin (LIPITOR) 40 MG tablet, Take 1 tablet by mouth Every Night., Disp: 90 tablet, Rfl: 3  •  CALCIUM PO, Take 1,000 mg by mouth. Patient takes 2 a day, Disp: , Rfl:   •  clindamycin (CLEOCIN) 300 MG capsule, As needed when has a dental appt (Patient taking differently: Take As Directed. As needed when has a dental appt), Disp: 10 capsule, Rfl: 0  •  levothyroxine (Synthroid) 88 MCG tablet, Take 1 tablet by mouth Daily., Disp: 60 tablet, Rfl: 0  •  losartan (COZAAR) 100 MG tablet, TAKE 1 TABLET BY MOUTH EVERY DAY, Disp: 90 tablet, Rfl: 3    Labs:    Labs from 2/28/2023 reviewed    ROS:   Influenza-like illness: no fever, no cough, no sore throat, no body aches, no loss of sense of taste or smell, no known exposure to person with Covid-19.  Constitutional: Negative for fevers or chills  HENT: Negative for hearing loss or runny nose  Eyes: Negative for vision changes or scleral icterus  Respiratory: Negative for cough or shortness of breath  Cardiovascular: Negative for chest pain or heart palpitations  Gastrointestinal: Negative for abdominal pain, nausea, vomiting, constipation, melena, or hematochezia  Genitourinary: Negative for hematuria or dysuria  Musculoskeletal: Negative for joint swelling or gait instability  Neurologic: Negative for tremors or seizures  Psychiatric: Negative for suicidal ideations or depression  All  other systems reviewed and negative    PHYSICAL EXAM:   • Constitutional: Well-developed, well-nourished, no acute distress  • Eyes: Conjunctiva normal, sclera nonicteric  • ENMT: Hearing grossly normal, oral mucosa moist  • Neck: Supple, trachea midline  • Respiratory: Clear to auscultation bilaterally, normal inspiratory effort   • Cardiovascular: Regular rate, no murmur, no peripheral edema, no jugular venous distention  • Breast: symmetric  o Right: No visible abnormalities on inspection while seated, with arms raised or hands on hips. No masses, skin changes, or nipple abnormalities.  Right breast periareolar incision well healed.  o Left: No visible abnormalities on inspection while seated, with arms raised or hands on hips. No masses, skin changes, or nipple abnormalities.   o No clinical chest wall involvement.  • Lymphatics (palpable nodes): No cervical, supraclavicular, or axillary lymphadenopathy  • Skin: Warm, dry, no rash on visualized skin surfaces  • Musculoskeletal: Symmetric strength, normal gait  • Psychiatric: Alert and oriented ×3, normal affect     Cheri Husain PA-C  General Surgery     [facility]  [address], [address]  [place] [address]  P: [phone]  F: [phone]

## 2023-03-14 ENCOUNTER — OFFICE VISIT (OUTPATIENT)
Dept: SURGERY | Facility: CLINIC | Age: 73
End: 2023-03-14
Payer: MEDICARE

## 2023-03-14 VITALS — BODY MASS INDEX: 31.43 KG/M2 | WEIGHT: 177.4 LBS | HEIGHT: 63 IN

## 2023-03-14 DIAGNOSIS — Z12.39 BREAST CANCER SCREENING, HIGH RISK PATIENT: Primary | ICD-10-CM

## 2023-03-14 DIAGNOSIS — N64.9 DISORDER OF BREAST, UNSPECIFIED: ICD-10-CM

## 2023-03-14 DIAGNOSIS — N60.91 ATYPICAL LOBULAR HYPERPLASIA (ALH) OF RIGHT BREAST: ICD-10-CM

## 2023-03-14 DIAGNOSIS — Z12.31 SCREENING MAMMOGRAM FOR BREAST CANCER: ICD-10-CM

## 2023-03-14 PROCEDURE — 1159F MED LIST DOCD IN RCRD: CPT

## 2023-03-14 PROCEDURE — 1160F RVW MEDS BY RX/DR IN RCRD: CPT

## 2023-03-14 PROCEDURE — 99214 OFFICE O/P EST MOD 30 MIN: CPT

## 2023-03-15 ENCOUNTER — TELEPHONE (OUTPATIENT)
Dept: SURGERY | Facility: CLINIC | Age: 73
End: 2023-03-15
Payer: MEDICARE

## 2023-03-15 NOTE — TELEPHONE ENCOUNTER
Attempted to contact patient to inform mammogram and MRI appointments. Left voicemail to call back.     Mammo 3/21 11:20 @ [place] location    MRI : 9/12/23 @ 1:45 [facility]

## 2023-03-21 ENCOUNTER — HOSPITAL ENCOUNTER (OUTPATIENT)
Dept: MAMMOGRAPHY | Facility: HOSPITAL | Age: 73
Discharge: HOME | End: 2023-03-21
Payer: MEDICARE

## 2023-03-21 DIAGNOSIS — Z12.31 SCREENING MAMMOGRAM FOR BREAST CANCER: ICD-10-CM

## 2023-03-21 DIAGNOSIS — Z12.39 BREAST CANCER SCREENING, HIGH RISK PATIENT: ICD-10-CM

## 2023-03-21 DIAGNOSIS — N60.91 ATYPICAL LOBULAR HYPERPLASIA (ALH) OF RIGHT BREAST: ICD-10-CM

## 2023-03-21 PROCEDURE — 77063 BREAST TOMOSYNTHESIS BI: CPT

## 2023-03-21 PROCEDURE — 77067 SCR MAMMO BI INCL CAD: CPT

## 2023-03-22 ENCOUNTER — TELEPHONE (OUTPATIENT)
Dept: SURGERY | Facility: CLINIC | Age: 73
End: 2023-03-22
Payer: MEDICARE

## 2023-03-22 DIAGNOSIS — R92.8 ABNORMAL MAMMOGRAM OF LEFT BREAST: Primary | ICD-10-CM

## 2023-03-22 NOTE — TELEPHONE ENCOUNTER
Called patient to review recent screening mammogram results.  BI-RADS 0.  Focal asymmetry in the anterior one third of the left breast slightly lateral to the plane of the nipple. Recommend she proceed with a left breast diagnostic mammogram and ultrasound. Orders placed.  All questions were answered.    Cheri Husain PA-C  General Surgery     [facility]  [address], [address]  [place] [address]  P: [phone]  F: [phone]

## 2023-03-30 ENCOUNTER — CONSULT (OUTPATIENT)
Dept: ONCOLOGY | Facility: CLINIC | Age: 73
End: 2023-03-30
Payer: MEDICARE

## 2023-03-30 ENCOUNTER — LAB (OUTPATIENT)
Dept: OTHER | Facility: HOSPITAL | Age: 73
End: 2023-03-30
Payer: MEDICARE

## 2023-03-30 VITALS
HEART RATE: 76 BPM | HEIGHT: 63 IN | OXYGEN SATURATION: 97 % | TEMPERATURE: 98.2 F | RESPIRATION RATE: 18 BRPM | WEIGHT: 178.1 LBS | SYSTOLIC BLOOD PRESSURE: 147 MMHG | DIASTOLIC BLOOD PRESSURE: 79 MMHG | BODY MASS INDEX: 31.55 KG/M2

## 2023-03-30 DIAGNOSIS — R92.8 ABNORMAL MAMMOGRAM: ICD-10-CM

## 2023-03-30 DIAGNOSIS — N64.89 RADIAL SCAR OF RIGHT BREAST: Primary | ICD-10-CM

## 2023-03-30 DIAGNOSIS — N60.99 ATYPICAL LOBULAR HYPERPLASIA (ALH) OF BREAST: Primary | ICD-10-CM

## 2023-03-30 LAB
BASOPHILS # BLD AUTO: 0.08 10*3/MM3 (ref 0–0.2)
BASOPHILS NFR BLD AUTO: 0.9 % (ref 0–1.5)
DEPRECATED RDW RBC AUTO: 45.2 FL (ref 37–54)
EOSINOPHIL # BLD AUTO: 0.25 10*3/MM3 (ref 0–0.4)
EOSINOPHIL NFR BLD AUTO: 2.8 % (ref 0.3–6.2)
ERYTHROCYTE [DISTWIDTH] IN BLOOD BY AUTOMATED COUNT: 13 % (ref 12.3–15.4)
HCT VFR BLD AUTO: 41 % (ref 34–46.6)
HGB BLD-MCNC: 14.1 G/DL (ref 12–15.9)
IMM GRANULOCYTES # BLD AUTO: 0.05 10*3/MM3 (ref 0–0.05)
IMM GRANULOCYTES NFR BLD AUTO: 0.6 % (ref 0–0.5)
LYMPHOCYTES # BLD AUTO: 2.61 10*3/MM3 (ref 0.7–3.1)
LYMPHOCYTES NFR BLD AUTO: 29 % (ref 19.6–45.3)
MCH RBC QN AUTO: 32.5 PG (ref 26.6–33)
MCHC RBC AUTO-ENTMCNC: 34.4 G/DL (ref 31.5–35.7)
MCV RBC AUTO: 94.5 FL (ref 79–97)
MONOCYTES # BLD AUTO: 0.77 10*3/MM3 (ref 0.1–0.9)
MONOCYTES NFR BLD AUTO: 8.5 % (ref 5–12)
NEUTROPHILS NFR BLD AUTO: 5.25 10*3/MM3 (ref 1.7–7)
NEUTROPHILS NFR BLD AUTO: 58.2 % (ref 42.7–76)
NRBC BLD AUTO-RTO: 0 /100 WBC (ref 0–0.2)
PLATELET # BLD AUTO: 219 10*3/MM3 (ref 140–450)
PMV BLD AUTO: 11.5 FL (ref 6–12)
RBC # BLD AUTO: 4.34 10*6/MM3 (ref 3.77–5.28)
WBC NRBC COR # BLD: 9.01 10*3/MM3 (ref 3.4–10.8)

## 2023-03-30 PROCEDURE — 99204 OFFICE O/P NEW MOD 45 MIN: CPT | Performed by: INTERNAL MEDICINE

## 2023-03-30 PROCEDURE — 3077F SYST BP >= 140 MM HG: CPT | Performed by: INTERNAL MEDICINE

## 2023-03-30 PROCEDURE — 1160F RVW MEDS BY RX/DR IN RCRD: CPT | Performed by: INTERNAL MEDICINE

## 2023-03-30 PROCEDURE — 1126F AMNT PAIN NOTED NONE PRSNT: CPT | Performed by: INTERNAL MEDICINE

## 2023-03-30 PROCEDURE — 3078F DIAST BP <80 MM HG: CPT | Performed by: INTERNAL MEDICINE

## 2023-03-30 PROCEDURE — 36415 COLL VENOUS BLD VENIPUNCTURE: CPT

## 2023-03-30 PROCEDURE — 85025 COMPLETE CBC W/AUTO DIFF WBC: CPT | Performed by: INTERNAL MEDICINE

## 2023-03-30 PROCEDURE — 1159F MED LIST DOCD IN RCRD: CPT | Performed by: INTERNAL MEDICINE

## 2023-03-30 RX ORDER — TAMOXIFEN CITRATE 10 MG/1
5 TABLET ORAL DAILY
Qty: 45 TABLET | Refills: 3 | Status: SHIPPED | OUTPATIENT
Start: 2023-03-30

## 2023-03-30 NOTE — PROGRESS NOTES
Subjective   Jessi Ruby is a 72 y.o. female.  Referred by Cheri Husain for right breast atypical lobular hyperplasia    History of Present Illness   Ms. Ruby is a 72-year-old postmenopausal  lady who presented with a screen detected abnormality of the right breast in 2022 2/8/2022-bilateral screening mammogram  Indeterminate right breast focal asymmetry with questioned distortion.  Further evaluation recommended.    3/15/2022-right breast diagnostic mammogram and ultrasound  Partially persistent area of asymmetry/density in the posterior one third of the right breast located lateral to the plane of the nipple.  No sonographic correlate appreciated.  0.9 cm heterogeneous ill-defined hypoechoic and hyperechoic lesion in the right breast at 9:00, 2 cm from the nipple.  Ultrasound-guided right breast biopsy recommended.    4/19/2022  1.right breast 9:00, stereotactic biopsy-focal changes suggestive of benign radial scar.  No atypical hyperplasia, in situ nor invasive carcinoma.  2.right breast 9:00, 2 cm from the nipple-stereotactic biopsies  Benign hyalinized breast stroma and ducts.  No atypical hyperplasia, in situ or invasive carcinoma.    7/28/2022-right breast lumpectomy  1.atypical lobular hyperplasia  Area consistent with portion of radial scar  Margins are free of in situ and invasive carcinoma  2.right breast additional superior margin  Margins free of in situ or invasive carcinoma  Atypical lobular hyperplasia.    She was seen in the surgery clinic on 3/19/2023 and referred for discussing breast reduction.    Tyrer-Cuzick lifetime risk noted to be 16%.    Karen model 5-year risk noted to be 4.5% as opposed to average risk of 2.2%.  Lifetime risk noted to be 11.3% as opposed to 5.7% average risk.    Ms. Bermudez does have diagnosis of osteoporosis for which she is on Fosamax.  She also has stress incontinence.    Other comorbidities include hypertension, hypothyroidism.    3/21/2023-bilateral  screening mammogram-area of focal asymmetry in the anterior one third of the left breast slightly lateral to the plane of the nipple.  Further evaluation recommended.  No suspicious findings in the right breast.    DEXA 12/21/2022 shows osteopenia with a T score of -2.1.    The following portions of the patient's history were reviewed and updated as appropriate: allergies, current medications, past family history, past medical history, past social history, past surgical history and problem list.    Past Medical History:   Diagnosis Date   • Female stress incontinence    • Hyperlipidemia    • Hypertension    • Hyperthyroidism    • Radial scar of right breast         Past Surgical History:   Procedure Laterality Date   • BREAST BIOPSY Right 04/19/2022   • BREAST LUMPECTOMY Right 7/28/2022    Procedure: right breast wire localized excisional biopsy of radial scar (9:00, 2 cm from the nipple, tri-bell clip);  Surgeon: Sear Quinones MD;  Location: [facility];  Service: General;  Laterality: Right;   • D & C HYSTEROSCOPY ENDOMETRIAL ABLATION     • KNEE ARTHROSCOPY Bilateral     10/2000, 10/2002   • LASER ABLATION     • TOTAL KNEE ARTHROPLASTY Right 05/14/2012   • TOTAL KNEE ARTHROPLASTY Left 12/17/2012   • WOUND CLOSURE Right 06/27/2017    rt knee        Family History   Problem Relation Age of Onset   • Alzheimer's disease Mother    • Cancer Father         prostate   • Drug abuse Daughter    • Breast cancer Neg Hx    • Ovarian cancer Neg Hx    • Malig Hyperthermia Neg Hx    She has 5 sisters and 2 brothers.  Her father had prostate cancer.  No other family members with any malignancies.    Social History     Socioeconomic History   • Marital status:    Tobacco Use   • Smoking status: Some Days     Packs/day: 0.25     Types: Cigarettes     Start date: 1970     Passive exposure: Current   • Smokeless tobacco: Never   • Tobacco comments:     SOCIAL WITH WINE   Vaping Use   • Vaping Use: Never used   Substance  "and Sexual Activity   • Alcohol use: Yes     Alcohol/week: 2.0 - 4.0 standard drinks     Types: 2 - 4 Glasses of wine per week     Comment: SOCIAL   • Drug use: Never   • Sexual activity: Yes     Partners: Male        OB History        3    Para   3    Term   3            AB        Living           SAB        IAB        Ectopic        Molar        Multiple        Live Births                   Age at menarche-12  Age at first live childbirth-27   3 para 3  0  Oral contraceptive pill use when she was younger  No use of hormone replacement therapy  Age at menopause-50    Allergies   Allergen Reactions   • Penicillins Anaphylaxis   • Morphine Nausea And Vomiting            Review of Systems   Constitutional: Negative.    HENT: Negative.    Eyes: Negative.    Respiratory: Negative.    Cardiovascular: Negative.    Gastrointestinal: Negative.    Endocrine: Negative.    Genitourinary: Negative.  Positive for amenorrhea.   Musculoskeletal: Negative.    Allergic/Immunologic: Negative.    Neurological: Negative.    Hematological: Negative.    Psychiatric/Behavioral: Negative.          Objective   Blood pressure 147/79, pulse 76, temperature 98.2 °F (36.8 °C), temperature source Temporal, resp. rate 18, height 160 cm (62.99\"), weight 80.8 kg (178 lb 1.6 oz), SpO2 97 %, not currently breastfeeding.   Physical Exam  Constitutional:       Appearance: Normal appearance.   HENT:      Right Ear: External ear normal.      Left Ear: External ear normal.   Eyes:      Conjunctiva/sclera: Conjunctivae normal.   Cardiovascular:      Rate and Rhythm: Normal rate.   Pulmonary:      Effort: Pulmonary effort is normal.   Abdominal:      General: Abdomen is flat.   Musculoskeletal:         General: Normal range of motion.   Skin:     General: Skin is warm.   Neurological:      General: No focal deficit present.      Mental Status: She is alert and oriented to person, place, and time.   Psychiatric:         Mood and " Affect: Mood normal.         Behavior: Behavior normal.         Thought Content: Thought content normal.         Judgment: Judgment normal.       Breast Exam: Right breast appears normal inspection with well-healed scar.  No palpable abnormalities of the right breast.  Left breast appears normal inspection.  No palpable abnormalities of the left breast.    No visits with results within 30 Day(s) from this visit.   Latest known visit with results is:   Office Visit on 02/28/2023   Component Date Value Ref Range Status   • Glucose 02/28/2023 105 (H)  65 - 99 mg/dL Final   • BUN 02/28/2023 11  8 - 23 mg/dL Final   • Creatinine 02/28/2023 0.69  0.57 - 1.00 mg/dL Final   • EGFR Result 02/28/2023 91.8  >60.0 mL/min/1.73 Final    Comment: GFR Normal >60  Chronic Kidney Disease <60  Kidney Failure <15  The GFR formula is only valid for adults with stable renal  function between ages 18 and 70.     • BUN/Creatinine Ratio 02/28/2023 15.9  7.0 - 25.0 Final   • Sodium 02/28/2023 142  136 - 145 mmol/L Final   • Potassium 02/28/2023 4.7  3.5 - 5.2 mmol/L Final   • Chloride 02/28/2023 104  98 - 107 mmol/L Final   • Total CO2 02/28/2023 27.8  22.0 - 29.0 mmol/L Final   • Calcium 02/28/2023 10.1  8.6 - 10.5 mg/dL Final   • Total Protein 02/28/2023 7.0  6.0 - 8.5 g/dL Final   • Albumin 02/28/2023 4.9  3.5 - 5.2 g/dL Final   • Globulin 02/28/2023 2.1  gm/dL Final   • A/G Ratio 02/28/2023 2.3  g/dL Final   • Total Bilirubin 02/28/2023 0.8  0.0 - 1.2 mg/dL Final   • Alkaline Phosphatase 02/28/2023 96  39 - 117 U/L Final   • AST (SGOT) 02/28/2023 17  1 - 32 U/L Final   • ALT (SGPT) 02/28/2023 25  1 - 33 U/L Final   • Total Cholesterol 02/28/2023 173  0 - 200 mg/dL Final    Comment: Cholesterol Reference Ranges  (U.S. Department of Health and Human Services ATP III  Classifications)  Desirable          <200 mg/dL  Borderline High    200-239 mg/dL  High Risk          >240 mg/dL  Triglyceride Reference Ranges  (U.S. Department of Health and  Human Services ATP III  Classifications)  Normal           <150 mg/dL  Borderline High  150-199 mg/dL  High             200-499 mg/dL  Very High        >500 mg/dL  HDL Reference Ranges  (U.S. Department of Health and Human Services ATP III  Classifications)  Low     <40 mg/dl (major risk factor for CHD)  High    >60 mg/dl ('negative' risk factor for CHD)  LDL Reference Ranges  (U.S. Department of Health and Human Services ATP III  Classifications)  Optimal          <100 mg/dL  Near Optimal     100-129 mg/dL  Borderline High  130-159 mg/dL  High             160-189 mg/dL  Very High        >189 mg/dL     • Triglycerides 02/28/2023 277 (H)  0 - 150 mg/dL Final   • HDL Cholesterol 02/28/2023 41  40 - 60 mg/dL Final   • VLDL Cholesterol Simone 02/28/2023 46 (H)  5 - 40 mg/dL Final   • LDL Chol Calc ([facility]) 02/28/2023 86  0 - 100 mg/dL Final   • TSH 02/28/2023 0.182 (L)  0.270 - 4.200 uIU/mL Final   • 25 Hydroxy, Vitamin D 02/28/2023 40.6  30.0 - 100.0 ng/ml Final    Comment: Reference Range for Total Vitamin D 25(OH)  Deficiency <20.0 ng/mL  Insufficiency 21-29 ng/mL  Sufficiency  ng/mL  Toxicity >100 ng/ml          Mammo Screening Digital Tomosynthesis Bilateral With CAD    Result Date: 3/21/2023  1. There is an area of focal asymmetry in the anterior one-third of the left breast located slightly lateral to the plane of the nipple. Further evaluation with spot compression CC mammographic and tomosynthesis images and possible targeted left breast sonography is recommended. 2. There are no findings suspicious for malignancy in the right breast. Postsurgical change of the right breast is noted.  BI-RADS Category 0: Incomplete - Needs additional imaging evaluation.  This report was finalized on 3/21/2023 4:31 PM by Dr. Nikos Oliva M.D.           Assessment & Plan       *Atypical lobular hyperplasia  · Tyrer-Cuzick lifetime risk estimated to be 16%, Karen model lifetime risk estimate noted to be 11.3% and 5-year risk  4.5%.  · This is increased compared to average woman her age.  · I explained to her that there is increased risk of breast cancer associated with ALH and the risk is anywhere from 2-5 fold.  · The breast cancer risk is increased not only in the ipsilateral but also on the contralateral breast.  · Given that she is in her 70s her lifetime risk is elevated but not similar to if she was younger.  · There would be a benefit from endocrine therapy such as tamoxifen or aromatase inhibitors.  · However it would be important to consider the side effects.  · Given that she has stress incontinence and osteopenia I would not recommend aromatase inhibitors.  · We discussed the side effects of tamoxifen 20 mg including but not limited to hot flashes, mood changes, fatigue, nausea, increased risk of DVT PE, risk of stroke, increased risk of uterine cancer.  · We also discussed the 5 mg dosing of tamoxifen for prevention and the fact that it is associated with less side effects.  · She is willing to proceed with tamoxifen 5 mg daily    *Left breast abnormality  · She is scheduled for diagnostic mammogram and ultrasound as well as MRI for further evaluation of the    *Osteopenia  · Recent DEXA from December 2022 suggestive of osteopenia with a T score of -2.1  · Continue Fosamax  · Continue calcium and vitamin D    *Hyperlipidemia-continue atorvastatin    *Follow-up-6 months.  I will plan to see her sooner if the left breast abnormality persists on diagnostic imaging and requires a biopsy.

## 2023-04-13 DIAGNOSIS — E03.9 ADULT HYPOTHYROIDISM: ICD-10-CM

## 2023-04-13 RX ORDER — LEVOTHYROXINE SODIUM 88 UG/1
88 TABLET ORAL DAILY
Qty: 90 TABLET | Refills: 3 | Status: SHIPPED | OUTPATIENT
Start: 2023-04-13

## 2023-04-18 ENCOUNTER — HOSPITAL ENCOUNTER (OUTPATIENT)
Dept: MAMMOGRAPHY | Facility: HOSPITAL | Age: 73
Discharge: HOME | End: 2023-04-18
Payer: MEDICARE

## 2023-04-18 ENCOUNTER — HOSPITAL ENCOUNTER (OUTPATIENT)
Dept: ULTRASOUND IMAGING | Facility: HOSPITAL | Age: 73
End: 2023-04-18
Payer: MEDICARE

## 2023-04-18 ENCOUNTER — TELEPHONE (OUTPATIENT)
Dept: SURGERY | Facility: CLINIC | Age: 73
End: 2023-04-18
Payer: MEDICARE

## 2023-04-18 DIAGNOSIS — R92.8 ABNORMAL MAMMOGRAM OF LEFT BREAST: ICD-10-CM

## 2023-04-18 PROCEDURE — G0279 TOMOSYNTHESIS, MAMMO: HCPCS

## 2023-04-18 PROCEDURE — 77065 DX MAMMO INCL CAD UNI: CPT

## 2023-04-18 NOTE — TELEPHONE ENCOUNTER
----- Message from Cheri Husain PA-C sent at 4/18/2023 10:42 AM EDT -----  Regarding: results  Hi,    Can you please call her and let her know her recent mammogram was negative. Recommend she return to routine screening mammogram, which will be due 03/2024.    4/18/2023 Left Diagnostic Mammogram:  IMPRESSION:  No mammographic evidence of malignancy in the left breast. Recommend annual screening mammogram in March 2024.  BI-RADS Category 1: Negative    Thanks,  Cheri

## 2023-09-12 ENCOUNTER — HOSPITAL ENCOUNTER (OUTPATIENT)
Dept: MRI IMAGING | Facility: HOSPITAL | Age: 73
Discharge: HOME | End: 2023-09-12
Payer: MEDICARE

## 2023-09-12 DIAGNOSIS — Z12.39 BREAST CANCER SCREENING, HIGH RISK PATIENT: ICD-10-CM

## 2023-09-12 DIAGNOSIS — N64.9 DISORDER OF BREAST, UNSPECIFIED: ICD-10-CM

## 2023-09-12 PROCEDURE — C8908 MRI W/O FOL W/CONT, BREAST,: HCPCS

## 2023-09-12 PROCEDURE — C8937 CAD BREAST MRI: HCPCS

## 2023-09-12 PROCEDURE — 0 GADOBENATE DIMEGLUMINE 529 MG/ML SOLUTION

## 2023-09-12 PROCEDURE — A9577 INJ MULTIHANCE: HCPCS

## 2023-09-12 PROCEDURE — 82565 ASSAY OF CREATININE: CPT

## 2023-09-12 RX ADMIN — GADOBENATE DIMEGLUMINE 16 ML: 529 INJECTION, SOLUTION INTRAVENOUS at 14:26

## 2023-09-14 ENCOUNTER — OFFICE VISIT (OUTPATIENT)
Dept: ONCOLOGY | Facility: CLINIC | Age: 73
End: 2023-09-14
Payer: MEDICARE

## 2023-09-14 VITALS
OXYGEN SATURATION: 96 % | WEIGHT: 181.9 LBS | HEART RATE: 72 BPM | DIASTOLIC BLOOD PRESSURE: 74 MMHG | BODY MASS INDEX: 32.23 KG/M2 | TEMPERATURE: 97.7 F | HEIGHT: 63 IN | SYSTOLIC BLOOD PRESSURE: 151 MMHG | RESPIRATION RATE: 16 BRPM

## 2023-09-14 DIAGNOSIS — N64.89 RADIAL SCAR OF RIGHT BREAST: Primary | ICD-10-CM

## 2023-09-14 LAB — CREAT BLDA-MCNC: 0.7 MG/DL (ref 0.6–1.3)

## 2023-09-14 PROCEDURE — 3078F DIAST BP <80 MM HG: CPT | Performed by: INTERNAL MEDICINE

## 2023-09-14 PROCEDURE — 1159F MED LIST DOCD IN RCRD: CPT | Performed by: INTERNAL MEDICINE

## 2023-09-14 PROCEDURE — 1126F AMNT PAIN NOTED NONE PRSNT: CPT | Performed by: INTERNAL MEDICINE

## 2023-09-14 PROCEDURE — 99214 OFFICE O/P EST MOD 30 MIN: CPT | Performed by: INTERNAL MEDICINE

## 2023-09-14 PROCEDURE — 3077F SYST BP >= 140 MM HG: CPT | Performed by: INTERNAL MEDICINE

## 2023-09-14 PROCEDURE — 1160F RVW MEDS BY RX/DR IN RCRD: CPT | Performed by: INTERNAL MEDICINE

## 2023-09-14 NOTE — PROGRESS NOTES
Subjective   Jessi Ruby is a 72 y.o. female.  Referred by Cheri Husain for right breast atypical lobular hyperplasia    History of Present Illness   Ms. Ruby is a 72-year-old postmenopausal  lady who presented with a screen detected abnormality of the right breast in 2022 2/8/2022-bilateral screening mammogram  Indeterminate right breast focal asymmetry with questioned distortion.  Further evaluation recommended.    3/15/2022-right breast diagnostic mammogram and ultrasound  Partially persistent area of asymmetry/density in the posterior one third of the right breast located lateral to the plane of the nipple.  No sonographic correlate appreciated.  0.9 cm heterogeneous ill-defined hypoechoic and hyperechoic lesion in the right breast at 9:00, 2 cm from the nipple.  Ultrasound-guided right breast biopsy recommended.    4/19/2022  1.right breast 9:00, stereotactic biopsy-focal changes suggestive of benign radial scar.  No atypical hyperplasia, in situ nor invasive carcinoma.  2.right breast 9:00, 2 cm from the nipple-stereotactic biopsies  Benign hyalinized breast stroma and ducts.  No atypical hyperplasia, in situ or invasive carcinoma.    7/28/2022-right breast lumpectomy  1.atypical lobular hyperplasia  Area consistent with portion of radial scar  Margins are free of in situ and invasive carcinoma  2.right breast additional superior margin  Margins free of in situ or invasive carcinoma  Atypical lobular hyperplasia.    She was seen in the surgery clinic on 3/19/2023 and referred for discussing breast reduction.    Tyrer-Cuzick lifetime risk noted to be 16%.    Karen model 5-year risk noted to be 4.5% as opposed to average risk of 2.2%.  Lifetime risk noted to be 11.3% as opposed to 5.7% average risk.    Ms. Bermudez does have diagnosis of osteoporosis for which she is on Fosamax.  She also has stress incontinence.    Other comorbidities include hypertension, hypothyroidism.    3/21/2023-bilateral  screening mammogram-area of focal asymmetry in the anterior one third of the left breast slightly lateral to the plane of the nipple.  Further evaluation recommended.  No suspicious findings in the right breast.    DEXA 12/21/2022 shows osteopenia with a T score of -2.1.    Interval history  Ms. Ruby presents to the clinic today for follow-up.  She is tolerating tamoxifen every other day well.  Has occasional hot flashes but no other complaints.  Denies any new breast lesions.    The following portions of the patient's history were reviewed and updated as appropriate: allergies, current medications, past family history, past medical history, past social history, past surgical history and problem list.    Past Medical History:   Diagnosis Date    Female stress incontinence     Hyperlipidemia     Hypertension     Hyperthyroidism     Radial scar of right breast         Past Surgical History:   Procedure Laterality Date    BREAST BIOPSY Right 04/19/2022    BREAST LUMPECTOMY Right 7/28/2022    Procedure: right breast wire localized excisional biopsy of radial scar (9:00, 2 cm from the nipple, tri-bell clip);  Surgeon: Sera Quinones MD;  [place] [facility] [place] [facility] [place]  Service: General;  Laterality: Right;    D & C HYSTEROSCOPY ENDOMETRIAL ABLATION      KNEE ARTHROSCOPY Bilateral     10/2000, 10/2002    LASER ABLATION      TOTAL KNEE ARTHROPLASTY Right 05/14/2012    TOTAL KNEE ARTHROPLASTY Left 12/17/2012    WOUND CLOSURE Right 06/27/2017    rt knee        Family History   Problem Relation Age of Onset    Alzheimer's disease Mother     Cancer Father         prostate    Drug abuse Daughter     Breast cancer Neg Hx     Ovarian cancer Neg Hx     Malig Hyperthermia Neg Hx    She has 5 sisters and 2 brothers.  Her father had prostate cancer.  No other family members with any malignancies.    Social History     Socioeconomic History    Marital status:    Tobacco Use    Smoking status: Some Days     Packs/day: 0.25      "Types: Cigarettes     Start date:      Passive exposure: Current    Smokeless tobacco: Never    Tobacco comments:     SOCIAL WITH WINE   Vaping Use    Vaping Use: Never used   Substance and Sexual Activity    Alcohol use: Yes     Alcohol/week: 2.0 - 4.0 standard drinks     Types: 2 - 4 Glasses of wine per week     Comment: SOCIAL    Drug use: Never    Sexual activity: Yes     Partners: Male        OB History          3    Para   3    Term   3            AB        Living             SAB        IAB        Ectopic        Molar        Multiple        Live Births                   Age at menarche-12  Age at first live childbirth-27   3 para 3  0  Oral contraceptive pill use when she was younger  No use of hormone replacement therapy  Age at menopause-50    Allergies   Allergen Reactions    Penicillins Anaphylaxis    Morphine Nausea And Vomiting            Review of Systems   Constitutional: Negative.    HENT: Negative.     Eyes: Negative.    Respiratory: Negative.     Cardiovascular: Negative.    Gastrointestinal: Negative.    Endocrine: Negative.    Genitourinary: Negative.  Positive for amenorrhea.   Musculoskeletal: Negative.    Allergic/Immunologic: Negative.    Neurological: Negative.    Hematological: Negative.    Psychiatric/Behavioral: Negative.       Review of systems as mentioned in the HPI otherwise negative    Objective   Blood pressure 151/74, pulse 72, temperature 97.7 °F (36.5 °C), temperature source Temporal, resp. rate 16, height 160 cm (62.99\"), weight 82.5 kg (181 lb 14.4 oz), SpO2 96 %, not currently breastfeeding.   Physical Exam  Constitutional:       Appearance: Normal appearance.   HENT:      Right Ear: External ear normal.      Left Ear: External ear normal.   Eyes:      Conjunctiva/sclera: Conjunctivae normal.   Cardiovascular:      Rate and Rhythm: Normal rate.   Pulmonary:      Effort: Pulmonary effort is normal.   Abdominal:      General: Abdomen is flat. "   Musculoskeletal:         General: Normal range of motion.   Skin:     General: Skin is warm.   Neurological:      General: No focal deficit present.      Mental Status: She is alert and oriented to person, place, and time.   Psychiatric:         Mood and Affect: Mood normal.         Behavior: Behavior normal.         Thought Content: Thought content normal.         Judgment: Judgment normal.     Breast Exam: Right breast appears normal inspection with well-healed scar.  No palpable abnormalities of the right breast.  Left breast appears normal inspection.  No palpable abnormalities of the left breast.    I have reexamined the patient and the results are consistent with the previously documented exam. Mariela Avalos MD      Hospital Outpatient Visit on 09/12/2023   Component Date Value Ref Range Status    Creatinine 09/12/2023 0.70  0.60 - 1.30 mg/dL Final    Serial Number: [id number]:  [id number]        No radiology results for the last 30 days.         Assessment & Plan       *Atypical lobular hyperplasia  Tyrer-Cuzick lifetime risk estimated to be 16%, Karen model lifetime risk estimate noted to be 11.3% and 5-year risk 4.5%.  This is increased compared to average woman her age.  I explained to her that there is increased risk of breast cancer associated with ALH and the risk is anywhere from 2-5 fold.  The breast cancer risk is increased not only in the ipsilateral but also on the contralateral breast.  Given that she is in her 70s her lifetime risk is elevated but not similar to if she was younger.  There would be a benefit from endocrine therapy such as tamoxifen or aromatase inhibitors.  However it would be important to consider the side effects.  Given that she has stress incontinence and osteopenia I would not recommend aromatase inhibitors.  We discussed the side effects of tamoxifen 20 mg including but not limited to hot flashes, mood changes, fatigue, nausea, increased risk of DVT PE, risk of stroke,  increased risk of uterine cancer.  We also discussed the 5 mg dosing of tamoxifen for prevention and the fact that it is associated with less side effects.  March 2023-low-dose tamoxifen started  9/14/2023-patient tolerating medication well.  No evidence of malignancy.  9/12/2023-bilateral breast MRI benign    *Left breast abnormality  4/18/2023-left breast diagnostic mammogram benign    *Osteopenia  Recent DEXA from December 2022 suggestive of osteopenia with a T score of -2.1  Continue Fosamax  Continue calcium and vitamin D    *Hyperlipidemia-continue atorvastatin    *Follow-up-6 months

## 2023-09-15 ENCOUNTER — TELEPHONE (OUTPATIENT)
Dept: SURGERY | Facility: CLINIC | Age: 73
End: 2023-09-15
Payer: MEDICARE

## 2023-09-15 DIAGNOSIS — Z12.31 ENCOUNTER FOR SCREENING MAMMOGRAM FOR MALIGNANT NEOPLASM OF BREAST: Primary | ICD-10-CM

## 2023-09-15 NOTE — TELEPHONE ENCOUNTER
Called Patient to inform of benign MRI results and made aware that Mammogram is due March 2024. Patient confirmed understanding. Patient is wanting to get this scheduled, please place orders

## 2024-01-25 DIAGNOSIS — E78.5 HYPERLIPIDEMIA, UNSPECIFIED HYPERLIPIDEMIA TYPE: ICD-10-CM

## 2024-01-25 RX ORDER — ATORVASTATIN CALCIUM 40 MG/1
40 TABLET, FILM COATED ORAL
Qty: 90 TABLET | Refills: 3 | Status: SHIPPED | OUTPATIENT
Start: 2024-01-25

## 2024-02-14 DIAGNOSIS — I10 PRIMARY HYPERTENSION: ICD-10-CM

## 2024-02-14 RX ORDER — LOSARTAN POTASSIUM 100 MG/1
TABLET ORAL
Qty: 90 TABLET | Refills: 3 | Status: SHIPPED | OUTPATIENT
Start: 2024-02-14

## 2024-02-21 DIAGNOSIS — M81.0 HIGH RISK FOR FRACTURE DUE TO OSTEOPOROSIS BY DEXA SCAN: ICD-10-CM

## 2024-02-21 RX ORDER — ALENDRONATE SODIUM 70 MG/1
70 TABLET ORAL
Qty: 12 TABLET | Refills: 3 | Status: SHIPPED | OUTPATIENT
Start: 2024-02-21

## 2024-03-01 ENCOUNTER — OFFICE VISIT (OUTPATIENT)
Dept: FAMILY MEDICINE CLINIC | Facility: CLINIC | Age: 74
End: 2024-03-01
Payer: MEDICARE

## 2024-03-01 VITALS
OXYGEN SATURATION: 98 % | HEART RATE: 68 BPM | WEIGHT: 181 LBS | TEMPERATURE: 97.1 F | BODY MASS INDEX: 32.07 KG/M2 | DIASTOLIC BLOOD PRESSURE: 60 MMHG | SYSTOLIC BLOOD PRESSURE: 106 MMHG | HEIGHT: 63 IN

## 2024-03-01 DIAGNOSIS — Z00.00 MEDICARE ANNUAL WELLNESS VISIT, SUBSEQUENT: Primary | ICD-10-CM

## 2024-03-01 DIAGNOSIS — R73.09 ELEVATED RANDOM BLOOD GLUCOSE LEVEL: ICD-10-CM

## 2024-03-01 DIAGNOSIS — N95.1 VASOMOTOR SYMPTOMS DUE TO MENOPAUSE: ICD-10-CM

## 2024-03-01 DIAGNOSIS — Z13.0 SCREENING FOR DEFICIENCY ANEMIA: ICD-10-CM

## 2024-03-01 DIAGNOSIS — Z13.6 SCREENING FOR ISCHEMIC HEART DISEASE: ICD-10-CM

## 2024-03-01 DIAGNOSIS — N32.81 OVERACTIVE BLADDER: ICD-10-CM

## 2024-03-01 DIAGNOSIS — E03.9 ADULT HYPOTHYROIDISM: ICD-10-CM

## 2024-03-01 LAB
ALBUMIN SERPL-MCNC: 4.4 G/DL (ref 3.5–5.2)
ALBUMIN/GLOB SERPL: 2.1 G/DL
ALP SERPL-CCNC: 53 U/L (ref 39–117)
ALT SERPL-CCNC: 23 U/L (ref 1–33)
AST SERPL-CCNC: 20 U/L (ref 1–32)
BASOPHILS # BLD AUTO: 0.07 10*3/MM3 (ref 0–0.2)
BASOPHILS NFR BLD AUTO: 1 % (ref 0–1.5)
BILIRUB SERPL-MCNC: 0.6 MG/DL (ref 0–1.2)
BUN SERPL-MCNC: 11 MG/DL (ref 8–23)
BUN/CREAT SERPL: 19 (ref 7–25)
CALCIUM SERPL-MCNC: 9.6 MG/DL (ref 8.6–10.5)
CHLORIDE SERPL-SCNC: 104 MMOL/L (ref 98–107)
CHOLEST SERPL-MCNC: 147 MG/DL (ref 0–200)
CO2 SERPL-SCNC: 25.9 MMOL/L (ref 22–29)
CREAT SERPL-MCNC: 0.58 MG/DL (ref 0.57–1)
EGFRCR SERPLBLD CKD-EPI 2021: 95.1 ML/MIN/1.73
EOSINOPHIL # BLD AUTO: 0.11 10*3/MM3 (ref 0–0.4)
EOSINOPHIL NFR BLD AUTO: 1.5 % (ref 0.3–6.2)
ERYTHROCYTE [DISTWIDTH] IN BLOOD BY AUTOMATED COUNT: 11.7 % (ref 12.3–15.4)
GLOBULIN SER CALC-MCNC: 2.1 GM/DL
GLUCOSE SERPL-MCNC: 96 MG/DL (ref 65–99)
HBA1C MFR BLD: 5.7 % (ref 4.8–5.6)
HCT VFR BLD AUTO: 40.7 % (ref 34–46.6)
HDLC SERPL-MCNC: 36 MG/DL (ref 40–60)
HGB BLD-MCNC: 13.7 G/DL (ref 12–15.9)
IMM GRANULOCYTES # BLD AUTO: 0.01 10*3/MM3 (ref 0–0.05)
IMM GRANULOCYTES NFR BLD AUTO: 0.1 % (ref 0–0.5)
LDLC SERPL CALC-MCNC: 78 MG/DL (ref 0–100)
LYMPHOCYTES # BLD AUTO: 1.92 10*3/MM3 (ref 0.7–3.1)
LYMPHOCYTES NFR BLD AUTO: 27 % (ref 19.6–45.3)
MCH RBC QN AUTO: 32.1 PG (ref 26.6–33)
MCHC RBC AUTO-ENTMCNC: 33.7 G/DL (ref 31.5–35.7)
MCV RBC AUTO: 95.3 FL (ref 79–97)
MONOCYTES # BLD AUTO: 0.56 10*3/MM3 (ref 0.1–0.9)
MONOCYTES NFR BLD AUTO: 7.9 % (ref 5–12)
NEUTROPHILS # BLD AUTO: 4.45 10*3/MM3 (ref 1.7–7)
NEUTROPHILS NFR BLD AUTO: 62.5 % (ref 42.7–76)
NRBC BLD AUTO-RTO: 0 /100 WBC (ref 0–0.2)
PLATELET # BLD AUTO: 209 10*3/MM3 (ref 140–450)
POTASSIUM SERPL-SCNC: 4.3 MMOL/L (ref 3.5–5.2)
PROT SERPL-MCNC: 6.5 G/DL (ref 6–8.5)
RBC # BLD AUTO: 4.27 10*6/MM3 (ref 3.77–5.28)
SODIUM SERPL-SCNC: 141 MMOL/L (ref 136–145)
TRIGL SERPL-MCNC: 192 MG/DL (ref 0–150)
TSH SERPL DL<=0.005 MIU/L-ACNC: 6.74 UIU/ML (ref 0.27–4.2)
VLDLC SERPL CALC-MCNC: 33 MG/DL (ref 5–40)
WBC # BLD AUTO: 7.12 10*3/MM3 (ref 3.4–10.8)

## 2024-03-01 PROCEDURE — 1170F FXNL STATUS ASSESSED: CPT | Performed by: STUDENT IN AN ORGANIZED HEALTH CARE EDUCATION/TRAINING PROGRAM

## 2024-03-01 PROCEDURE — 1160F RVW MEDS BY RX/DR IN RCRD: CPT | Performed by: STUDENT IN AN ORGANIZED HEALTH CARE EDUCATION/TRAINING PROGRAM

## 2024-03-01 PROCEDURE — G0439 PPPS, SUBSEQ VISIT: HCPCS | Performed by: STUDENT IN AN ORGANIZED HEALTH CARE EDUCATION/TRAINING PROGRAM

## 2024-03-01 PROCEDURE — 1159F MED LIST DOCD IN RCRD: CPT | Performed by: STUDENT IN AN ORGANIZED HEALTH CARE EDUCATION/TRAINING PROGRAM

## 2024-03-01 PROCEDURE — 3078F DIAST BP <80 MM HG: CPT | Performed by: STUDENT IN AN ORGANIZED HEALTH CARE EDUCATION/TRAINING PROGRAM

## 2024-03-01 PROCEDURE — 3074F SYST BP LT 130 MM HG: CPT | Performed by: STUDENT IN AN ORGANIZED HEALTH CARE EDUCATION/TRAINING PROGRAM

## 2024-03-01 RX ORDER — OXYBUTYNIN CHLORIDE 15 MG/1
15 TABLET, EXTENDED RELEASE ORAL DAILY
Qty: 30 TABLET | Refills: 2 | Status: SHIPPED | OUTPATIENT
Start: 2024-03-01

## 2024-03-01 NOTE — PROGRESS NOTES
The ABCs of the Annual Wellness Visit  Subsequent Medicare Wellness Visit    Chief Complaint   Patient presents with    Medicare Wellness-subsequent     No complaints   -due for colonoscopy       Subjective    History of Present Illness:  Jessi Ruby is a 73 y.o. female who presents for a Subsequent Medicare Wellness Visit.    The following portions of the patient's history were reviewed and   updated as appropriate: allergies, current medications, past family history, past medical history, past social history, past surgical history, and problem list.    Compared to one year ago, the patient feels her physical   health is the same.    Compared to one year ago, the patient feels her mental   health is the same.    Recent Hospitalizations:  She was not admitted to the hospital during the last year.       Current Medical Providers:  Patient Care Team:  Ritika Chang MD as PCP - General (Family Medicine)  Cheri Husain PA-C as Referring Physician (Physician Assistant)    Outpatient Medications Prior to Visit   Medication Sig Dispense Refill    alendronate (FOSAMAX) 70 MG tablet TAKE 1 TABLET BY MOUTH EVERY 7 DAYS. 12 tablet 3    atorvastatin (LIPITOR) 40 MG tablet TAKE 1 TABLET BY MOUTH EVERY DAY AT NIGHT 90 tablet 3    CALCIUM PO Take 1,000 mg by mouth. Patient takes 2 a day      losartan (COZAAR) 100 MG tablet TAKE 1 TABLET BY MOUTH EVERY DAY 90 tablet 3    tamoxifen (NOLVADEX) 10 MG tablet Take 0.5 tablets by mouth Daily. 45 tablet 3    VITAMIN D, CHOLECALCIFEROL, PO Take 2,000 Units by mouth Daily.      levothyroxine (Synthroid) 88 MCG tablet Take 1 tablet by mouth Daily. 90 tablet 3    clindamycin (CLEOCIN) 300 MG capsule As needed when has a dental appt (Patient not taking: Reported on 3/1/2024) 10 capsule 0     No facility-administered medications prior to visit.       No opioid medication identified on active medication list. I have reviewed chart for other potential  high risk medication/s and harmful  "drug interactions in the elderly.        Aspirin is not on active medication list.  Aspirin use is not indicated based on review of current medical condition/s. Risk of harm outweighs potential benefits.  .    Patient Active Problem List   Diagnosis    Hyperlipidemia    Hypertension    Adult hypothyroidism    Cannot sleep    Breast cancer screening    At risk for dental problems    Colon cancer screening    Pap test, as part of routine gynecological examination    Acute pain of right shoulder    Radial scar of right breast    S/P knee replacement    Medicare annual wellness visit, subsequent     Advance Care Planning  Advance Directive is not on file.  ACP discussion was held with the patient during this visit. Patient has an advance directive (not in EMR), copy requested.          Objective    Vitals:    03/01/24 1003   BP: 106/60   BP Location: Right arm   Patient Position: Sitting   Cuff Size: Adult   Pulse: 68   Temp: 97.1 °F (36.2 °C)   TempSrc: Infrared   SpO2: 98%   Weight: 82.1 kg (181 lb)   Height: 160 cm (62.99\")   PainSc: 0-No pain     Estimated body mass index is 32.07 kg/m² as calculated from the following:    Height as of this encounter: 160 cm (62.99\").    Weight as of this encounter: 82.1 kg (181 lb).    BMI is >= 30 and <35. (Class 1 Obesity). The following options were offered after discussion;: exercise counseling/recommendations and nutrition counseling/recommendations      Does the patient have evidence of cognitive impairment? No    Physical Exam  Lab Results   Component Value Date    CHLPL 147 03/01/2024    TRIG 192 (H) 03/01/2024    HDL 36 (L) 03/01/2024    LDL 78 03/01/2024    VLDL 33 03/01/2024    HGBA1C 5.70 (H) 03/01/2024            HEALTH RISK ASSESSMENT    Smoking Status:  Social History     Tobacco Use   Smoking Status Former    Current packs/day: 0.00    Average packs/day: 0.3 packs/day for 51.0 years (12.8 ttl pk-yrs)    Types: Cigarettes    Start date: 1970    Quit date: 2021    " Years since quitting: 3.1    Passive exposure: Current   Smokeless Tobacco Never   Tobacco Comments    SOCIAL WITH WINE    -off and on smoking for 40 years      Alcohol Consumption:  Social History     Substance and Sexual Activity   Alcohol Use Yes    Alcohol/week: 2.0 - 4.0 standard drinks of alcohol    Types: 2 - 4 Glasses of wine per week    Comment: SOCIAL     Fall Risk Screen:    GOPI Fall Risk Assessment was completed, and patient is at LOW risk for falls.Assessment completed on:3/1/2024    Depression Screening:      3/1/2024    10:05 AM   PHQ-2/PHQ-9 Depression Screening   Little Interest or Pleasure in Doing Things 0-->not at all   Feeling Down, Depressed or Hopeless 0-->not at all   PHQ-9: Brief Depression Severity Measure Score 0       Health Habits and Functional and Cognitive Screening:      3/6/2024     8:00 PM   Functional & Cognitive Status   Do you have difficulty preparing food and eating? No   Do you have difficulty bathing yourself, getting dressed or grooming yourself? No   Do you have difficulty using the toilet? No   Do you have difficulty moving around from place to place? No   Do you have trouble with steps or getting out of a bed or a chair? No   Current Diet Well Balanced Diet   Dental Exam Up to date   Eye Exam Up to date   Exercise (times per week) 7 times per week   Current Exercises Include Walking   Do you need help using the phone?  No   Are you deaf or do you have serious difficulty hearing?  No   Do you need help to go to places out of walking distance? No   Do you need help shopping? No   Do you need help preparing meals?  No   Do you need help with housework?  No   Do you need help with laundry? No   Do you need help taking your medications? No   Do you need help managing money? No   Do you ever drive or ride in a car without wearing a seat belt? No   Have you felt unusual stress, anger or loneliness in the last month? No   Who do you live with? Spouse   If you need help, do  you have trouble finding someone available to you? No   Have you been bothered in the last four weeks by sexual problems? No   Do you have difficulty concentrating, remembering or making decisions? No       Age-appropriate Screening Schedule:  Refer to the list below for future screening recommendations based on patient's age, sex and/or medical conditions. Orders for these recommended tests are listed in the plan section. The patient has been provided with a written plan.    Health Maintenance   Topic Date Due    COLORECTAL CANCER SCREENING  Never done    ZOSTER VACCINE (1 of 2) Never done    RSV Vaccine - Adults (1 - 1-dose 60+ series) Never done    BMI FOLLOWUP  02/28/2024    DXA SCAN  12/21/2024    ANNUAL WELLNESS VISIT  03/01/2025    LIPID PANEL  03/01/2025    MAMMOGRAM  04/18/2025    TDAP/TD VACCINES (2 - Td or Tdap) 08/30/2032    HEPATITIS C SCREENING  Completed    COVID-19 Vaccine  Completed    INFLUENZA VACCINE  Completed    Pneumococcal Vaccine 65+  Completed              Assessment & Plan   CMS Preventative Services Quick Reference  Risk Factors Identified During Encounter  Immunizations Discussed/Encouraged: Shingrix and RSV (Respiratory Syncytial Virus)  Dental Screening Recommended  Vision Screening Recommended  The above risks/problems have been discussed with the patient.  Follow up actions/plans if indicated are seen below in the Assessment/Plan Section.  Pertinent information has been shared with the patient in the After Visit Summary.    Diagnoses and all orders for this visit:    1. Medicare annual wellness visit, subsequent (Primary)  Assessment & Plan:  Colonoscopy: defers today  Mammogram:UTD; repeat 1-2yrs  LDCT: never smoker  DEXA: last 12/2021 with osteopenia, on fosamax     Immunizations: eligible for Shingrex  Labs: ordered today, on statin  The 10-year ASCVD risk score (Werner DIANA, et al., 2019) is: 13.3%    Values used to calculate the score:      Age: 73 years      Sex: Female      Is  Non- : No      Diabetic: No      Tobacco smoker: No      Systolic Blood Pressure: 106 mmHg      Is BP treated: Yes      HDL Cholesterol: 41 mg/dL      Total Cholesterol: 173 mg/dL        Patient was counseled in regards to maintaining a healthy lifestyle, rich in whole grains, fruits and vegetables. Limit high saturated fats and processed sugars. Maintain an active lifestyle to promote overall health and well being.         2. Screening for deficiency anemia  -     CBC Auto Differential    3. Screening for ischemic heart disease  -     Comprehensive Metabolic Panel  -     Lipid Panel  -     TSH    4. Elevated random blood glucose level  -     ORDER: Hemoglobin A1c    5. Adult hypothyroidism  -     TSH    6. Overactive bladder  -     oxybutynin XL (DITROPAN XL) 15 MG 24 hr tablet; Take 1 tablet by mouth Daily.  Dispense: 30 tablet; Refill: 2    7. Vasomotor symptoms due to menopause  -     oxybutynin XL (DITROPAN XL) 15 MG 24 hr tablet; Take 1 tablet by mouth Daily.  Dispense: 30 tablet; Refill: 2    Other orders  -     CBC & Differential        Follow Up:   No follow-ups on file.     An After Visit Summary and PPPS were made available to the patient.

## 2024-03-01 NOTE — ASSESSMENT & PLAN NOTE
Colonoscopy: defers today  Mammogram:UTD; repeat 1-2yrs  LDCT: never smoker  DEXA: last 12/2021 with osteopenia, on fosamax     Immunizations: eligible for Shingrex  Labs: ordered today, on statin  The 10-year ASCVD risk score (Werner DIANA, et al., 2019) is: 13.3%    Values used to calculate the score:      Age: 73 years      Sex: Female      Is Non- : No      Diabetic: No      Tobacco smoker: No      Systolic Blood Pressure: 106 mmHg      Is BP treated: Yes      HDL Cholesterol: 41 mg/dL      Total Cholesterol: 173 mg/dL        Patient was counseled in regards to maintaining a healthy lifestyle, rich in whole grains, fruits and vegetables. Limit high saturated fats and processed sugars. Maintain an active lifestyle to promote overall health and well being.

## 2024-03-04 DIAGNOSIS — E03.9 ADULT HYPOTHYROIDISM: ICD-10-CM

## 2024-03-04 RX ORDER — LEVOTHYROXINE SODIUM 0.1 MG/1
100 TABLET ORAL
Qty: 90 TABLET | Refills: 0 | Status: SHIPPED | OUTPATIENT
Start: 2024-03-04

## 2024-03-12 NOTE — PROGRESS NOTES
Subjective   Jessi Ruby is a 73 y.o. female.  Referred by Cheri Husain for right breast atypical lobular hyperplasia    History of Present Illness   Ms. Ruby is a 72-year-old postmenopausal  lady who presented with a screen detected abnormality of the right breast in 2022 2/8/2022-bilateral screening mammogram  Indeterminate right breast focal asymmetry with questioned distortion.  Further evaluation recommended.    3/15/2022-right breast diagnostic mammogram and ultrasound  Partially persistent area of asymmetry/density in the posterior one third of the right breast located lateral to the plane of the nipple.  No sonographic correlate appreciated.  0.9 cm heterogeneous ill-defined hypoechoic and hyperechoic lesion in the right breast at 9:00, 2 cm from the nipple.  Ultrasound-guided right breast biopsy recommended.    4/19/2022  1.right breast 9:00, stereotactic biopsy-focal changes suggestive of benign radial scar.  No atypical hyperplasia, in situ nor invasive carcinoma.  2.right breast 9:00, 2 cm from the nipple-stereotactic biopsies  Benign hyalinized breast stroma and ducts.  No atypical hyperplasia, in situ or invasive carcinoma.    7/28/2022-right breast lumpectomy  1.atypical lobular hyperplasia  Area consistent with portion of radial scar  Margins are free of in situ and invasive carcinoma  2.right breast additional superior margin  Margins free of in situ or invasive carcinoma  Atypical lobular hyperplasia.    She was seen in the surgery clinic on 3/19/2023 and referred for discussing breast reduction.    Tyrer-Cuzick lifetime risk noted to be 16%.    Karen model 5-year risk noted to be 4.5% as opposed to average risk of 2.2%.  Lifetime risk noted to be 11.3% as opposed to 5.7% average risk.    Ms. Bermudez does have diagnosis of osteoporosis for which she is on Fosamax.  She also has stress incontinence.    Other comorbidities include hypertension, hypothyroidism.    3/21/2023-bilateral  screening mammogram-area of focal asymmetry in the anterior one third of the left breast slightly lateral to the plane of the nipple.  Further evaluation recommended.  No suspicious findings in the right breast.    DEXA 12/21/2022 shows osteopenia with a T score of -2.1.    Interval history  Jessi returns today for 6-month follow-up.  She continues on tamoxifen every other day, tolerating well.  She does continue with hot flashes during the day, does feel that hot flashes are tolerable.    Screening mammogram 3/21/2023 showing focal asymmetry in the anterior one third of the left breast, recommended diagnostic mammogram.  Right breast benign.  Left breast diagnostic mammogram benign, recommended annual screening.  MRI bilateral breast 9/12/2023 benign.     She otherwise has no new concerns today.    The following portions of the patient's history were reviewed and updated as appropriate: allergies, current medications, past family history, past medical history, past social history, past surgical history and problem list.    Past Medical History:   Diagnosis Date    Female stress incontinence     Hyperlipidemia     Hypertension     Hyperthyroidism     Medicare annual wellness visit, subsequent 02/28/2023    Radial scar of right breast         Past Surgical History:   Procedure Laterality Date    BREAST BIOPSY Right 04/19/2022    BREAST LUMPECTOMY Right 7/28/2022    Procedure: right breast wire localized excisional biopsy of radial scar (9:00, 2 cm from the nipple, tri-bell clip);  Surgeon: Sera Quinones MD;  Location: [facility] [place];  Service: General;  Laterality: Right;    D & C HYSTEROSCOPY ENDOMETRIAL ABLATION      KNEE ARTHROSCOPY Bilateral     10/2000, 10/2002    LASER ABLATION      TOTAL KNEE ARTHROPLASTY Right 05/14/2012    TOTAL KNEE ARTHROPLASTY Left 12/17/2012    WOUND CLOSURE Right 06/27/2017    rt knee        Family History   Problem Relation Age of Onset    Alzheimer's disease Mother     Cancer Father          prostate    Drug abuse Daughter     Breast cancer Neg Hx     Ovarian cancer Neg Hx     Malig Hyperthermia Neg Hx    She has 5 sisters and 2 brothers.  Her father had prostate cancer.  No other family members with any malignancies.    Social History     Socioeconomic History    Marital status:    Tobacco Use    Smoking status: Former     Current packs/day: 0.00     Average packs/day: 0.3 packs/day for 51.0 years (12.8 ttl pk-yrs)     Types: Cigarettes     Start date:      Quit date:      Years since quitting: 3.2     Passive exposure: Current    Smokeless tobacco: Never    Tobacco comments:     SOCIAL WITH WINE     -off and on smoking for 40 years    Vaping Use    Vaping status: Never Used   Substance and Sexual Activity    Alcohol use: Yes     Alcohol/week: 2.0 - 4.0 standard drinks of alcohol     Types: 2 - 4 Glasses of wine per week     Comment: SOCIAL    Drug use: Never    Sexual activity: Yes     Partners: Male        OB History          3    Para   3    Term   3            AB        Living             SAB        IAB        Ectopic        Molar        Multiple        Live Births                   Age at menarche-12  Age at first live childbirth-27   3 para 3  0  Oral contraceptive pill use when she was younger  No use of hormone replacement therapy  Age at menopause-50    Allergies   Allergen Reactions    Penicillins Anaphylaxis     Beta lactam allergy details  Antibiotic reaction: (!) shortness of breath, swollen tongue  Age at reaction: child  Dose to reaction time: unknown  Reason for antibiotic: unknown  Epinephrine required for reaction?: unknown  Tolerated antibiotics: unknown        Morphine Nausea And Vomiting            Review of Systems   Constitutional: Negative.    HENT: Negative.     Eyes: Negative.    Respiratory: Negative.     Cardiovascular: Negative.    Gastrointestinal: Negative.    Endocrine: Negative.    Genitourinary: Negative.  Positive for  "amenorrhea.   Musculoskeletal: Negative.    Allergic/Immunologic: Negative.    Neurological: Negative.    Hematological: Negative.    Psychiatric/Behavioral: Negative.         Review of systems as mentioned in the HPI otherwise negative    Objective   Blood pressure 121/74, pulse 70, temperature 98 °F (36.7 °C), temperature source Temporal, resp. rate 16, height 160 cm (62.99\"), weight 82.1 kg (181 lb), SpO2 96%, not currently breastfeeding.     Physical Exam  Constitutional:       Appearance: Normal appearance.   HENT:      Right Ear: External ear normal.      Left Ear: External ear normal.   Eyes:      Conjunctiva/sclera: Conjunctivae normal.   Cardiovascular:      Rate and Rhythm: Normal rate.   Pulmonary:      Effort: Pulmonary effort is normal.   Abdominal:      General: Abdomen is flat.   Musculoskeletal:         General: Normal range of motion.   Skin:     General: Skin is warm.   Neurological:      General: No focal deficit present.      Mental Status: She is alert and oriented to person, place, and time.   Psychiatric:         Mood and Affect: Mood normal.         Behavior: Behavior normal.         Thought Content: Thought content normal.         Judgment: Judgment normal.       Declined breast Exam today (had breast exam with surgeon yesterday): Previous right breast appears normal inspection with well-healed scar.  No palpable abnormalities of the right breast.  Left breast appears normal inspection.  No palpable abnormalities of the left breast.    I have reexamined the patient and the results are consistent with the previously documented exam. VY Walls      Office Visit on 03/01/2024   Component Date Value Ref Range Status    Glucose 03/01/2024 96  65 - 99 mg/dL Final    BUN 03/01/2024 11  8 - 23 mg/dL Final    Creatinine 03/01/2024 0.58  0.57 - 1.00 mg/dL Final    EGFR Result 03/01/2024 95.1  >60.0 mL/min/1.73 Final    Comment: GFR Normal >60  Chronic Kidney Disease <60  Kidney Failure " <15  The GFR formula is only valid for adults with stable renal  function between ages 18 and 70.      BUN/Creatinine Ratio 03/01/2024 19.0  7.0 - 25.0 Final    Sodium 03/01/2024 141  136 - 145 mmol/L Final    Potassium 03/01/2024 4.3  3.5 - 5.2 mmol/L Final    Chloride 03/01/2024 104  98 - 107 mmol/L Final    Total CO2 03/01/2024 25.9  22.0 - 29.0 mmol/L Final    Calcium 03/01/2024 9.6  8.6 - 10.5 mg/dL Final    Total Protein 03/01/2024 6.5  6.0 - 8.5 g/dL Final    Albumin 03/01/2024 4.4  3.5 - 5.2 g/dL Final    Globulin 03/01/2024 2.1  gm/dL Final    A/G Ratio 03/01/2024 2.1  g/dL Final    Total Bilirubin 03/01/2024 0.6  0.0 - 1.2 mg/dL Final    Alkaline Phosphatase 03/01/2024 53  39 - 117 U/L Final    AST (SGOT) 03/01/2024 20  1 - 32 U/L Final    ALT (SGPT) 03/01/2024 23  1 - 33 U/L Final    Hemoglobin A1C 03/01/2024 5.70 (H)  4.80 - 5.60 % Final    Comment: Hemoglobin A1C Ranges:  Increased Risk for Diabetes  5.7% to 6.4%  Diabetes                     >= 6.5%  Diabetic Goal                < 7.0%      Total Cholesterol 03/01/2024 147  0 - 200 mg/dL Final    Comment: Cholesterol Reference Ranges  (U.S. Department of Health and Human Services ATP III  Classifications)  Desirable          <200 mg/dL  Borderline High    200-239 mg/dL  High Risk          >240 mg/dL  Triglyceride Reference Ranges  (U.S. Department of Health and Human Services ATP III  Classifications)  Normal           <150 mg/dL  Borderline High  150-199 mg/dL  High             200-499 mg/dL  Very High        >500 mg/dL  HDL Reference Ranges  (U.S. Department of Health and Human Services ATP III  Classifications)  Low     <40 mg/dl (major risk factor for CHD)  High    >60 mg/dl ('negative' risk factor for CHD)  LDL Reference Ranges  (U.S. Department of Health and Human Services ATP III  Classifications)  Optimal          <100 mg/dL  Near Optimal     100-129 mg/dL  Borderline High  130-159 mg/dL  High             160-189 mg/dL  Very High        >189  mg/dL      Triglycerides 03/01/2024 192 (H)  0 - 150 mg/dL Final    HDL Cholesterol 03/01/2024 36 (L)  40 - 60 mg/dL Final    VLDL Cholesterol Simone 03/01/2024 33  5 - 40 mg/dL Final    LDL Chol Calc (NIH) 03/01/2024 78  0 - 100 mg/dL Final    TSH 03/01/2024 6.740 (H)  0.270 - 4.200 uIU/mL Final    WBC 03/01/2024 7.12  3.40 - 10.80 10*3/mm3 Final    RBC 03/01/2024 4.27  3.77 - 5.28 10*6/mm3 Final    Hemoglobin 03/01/2024 13.7  12.0 - 15.9 g/dL Final    Hematocrit 03/01/2024 40.7  34.0 - 46.6 % Final    MCV 03/01/2024 95.3  79.0 - 97.0 fL Final    MCH 03/01/2024 32.1  26.6 - 33.0 pg Final    MCHC 03/01/2024 33.7  31.5 - 35.7 g/dL Final    RDW 03/01/2024 11.7 (L)  12.3 - 15.4 % Final    Platelets 03/01/2024 209  140 - 450 10*3/mm3 Final    Neutrophil Rel % 03/01/2024 62.5  42.7 - 76.0 % Final    Lymphocyte Rel % 03/01/2024 27.0  19.6 - 45.3 % Final    Monocyte Rel % 03/01/2024 7.9  5.0 - 12.0 % Final    Eosinophil Rel % 03/01/2024 1.5  0.3 - 6.2 % Final    Basophil Rel % 03/01/2024 1.0  0.0 - 1.5 % Final    Neutrophils Absolute 03/01/2024 4.45  1.70 - 7.00 10*3/mm3 Final    Lymphocytes Absolute 03/01/2024 1.92  0.70 - 3.10 10*3/mm3 Final    Monocytes Absolute 03/01/2024 0.56  0.10 - 0.90 10*3/mm3 Final    Eosinophils Absolute 03/01/2024 0.11  0.00 - 0.40 10*3/mm3 Final    Basophils Absolute 03/01/2024 0.07  0.00 - 0.20 10*3/mm3 Final    Immature Granulocyte Rel % 03/01/2024 0.1  0.0 - 0.5 % Final    Immature Grans Absolute 03/01/2024 0.01  0.00 - 0.05 10*3/mm3 Final    nRBC 03/01/2024 0.0  0.0 - 0.2 /100 WBC Final        No radiology results for the last 30 days.         Assessment & Plan       *Atypical lobular hyperplasia  Aliciaer-Yumikozick lifetime risk estimated to be 16%, Karen model lifetime risk estimate noted to be 11.3% and 5-year risk 4.5%.  This is increased compared to average woman her age.  I explained to her that there is increased risk of breast cancer associated with ALH and the risk is anywhere from 2-5  fold.  The breast cancer risk is increased not only in the ipsilateral but also on the contralateral breast.  Given that she is in her 70s her lifetime risk is elevated but not similar to if she was younger.  There would be a benefit from endocrine therapy such as tamoxifen or aromatase inhibitors.  However it would be important to consider the side effects.  Given that she has stress incontinence and osteopenia I would not recommend aromatase inhibitors.  We discussed the side effects of tamoxifen 20 mg including but not limited to hot flashes, mood changes, fatigue, nausea, increased risk of DVT PE, risk of stroke, increased risk of uterine cancer.  We also discussed the 5 mg dosing of tamoxifen for prevention and the fact that it is associated with less side effects.  March 2023-low-dose tamoxifen started  Screening mammogram 3/21/2023 showing benign right breast and focal asymmetry in the left breast recommending diagnostic mammogram of the left breast.  Diagnostic mammogram left breast 4/18/2023 benign.  9/12/2023-bilateral breast MRI benign  3/14/2024: Continues low-dose tamoxifen, tolerating well aside from hot flashes which are tolerable.  MRI bilateral breasts due September 2024, order placed today.  Scheduled for screening mammogram 3/22/2024.    *Left breast abnormality  4/18/2023-left breast diagnostic mammogram benign    *Osteopenia  Recent DEXA from December 2022 suggestive of osteopenia with a T score of -2.1  Continue Fosamax  Continue calcium and vitamin D    *Hyperlipidemia-continue atorvastatin    PLAN:   Continue low-dose tamoxifen, 10 mg every other day.  Refill sent to pharmacy today.  Screening mammogram schedule 3/22/2024.  MRI bilateral breasts due September 2024, order placed today.   Return in 6 months for MD follow-up.    The patient is on high risk medication that requires close monitoring for toxicity.

## 2024-03-13 ENCOUNTER — OFFICE VISIT (OUTPATIENT)
Dept: SURGERY | Facility: CLINIC | Age: 74
End: 2024-03-13
Payer: MEDICARE

## 2024-03-13 VITALS
HEIGHT: 63 IN | WEIGHT: 182 LBS | DIASTOLIC BLOOD PRESSURE: 78 MMHG | SYSTOLIC BLOOD PRESSURE: 122 MMHG | BODY MASS INDEX: 32.25 KG/M2

## 2024-03-13 DIAGNOSIS — N60.91 ATYPICAL LOBULAR HYPERPLASIA (ALH) OF RIGHT BREAST: ICD-10-CM

## 2024-03-13 DIAGNOSIS — N64.89 RADIAL SCAR OF RIGHT BREAST: ICD-10-CM

## 2024-03-13 DIAGNOSIS — Z12.31 ENCOUNTER FOR SCREENING MAMMOGRAM FOR MALIGNANT NEOPLASM OF BREAST: ICD-10-CM

## 2024-03-13 DIAGNOSIS — Z91.89 AT HIGH RISK FOR BREAST CANCER: Primary | ICD-10-CM

## 2024-03-13 NOTE — PROGRESS NOTES
Assessment/Plan:     73 y.o. female with:    (1) High risk screening for breast cancer:  - Deshaun aVz lifetime breast cancer risk 16%. Karen Model lifetime risk 10.7%.  - Bilateral Screening Mammogram 03/2023 BIRADS 0. Left Breast Diagnostic Mammogram 04/2023 BIRADS 1. Annual mammogram due 3/2024. She has this scheduled for 3/22/2024.  - Bilateral Breast MRI 09/2023 BIRADS 2. Annual MRI due 09/2024.   - Currently on Tamoxifen. Following with Dr. Avalos.    - Follow up in one year.    (2) History of right breast radial scar and ALH:  - Status post right breast wire localized excisional biopsy 7/28/2022.    (3) Health Maintenance:  - Colon cancer screening: She is considering proceeding with a colonoscopy. Reviewed the nature of the procedure, benefits and risks, including but not limited to bleeding, infection, bowel perforation and the need for subsequent procedures. Reviewed the need for a bowel preparation the day prior. Should she decide to proceed, she will call the office. Will place orders at that time.     Chief complaint: management of breast cancer risk    HPI: Ms. Jessi Ruby is seen at the request of No ref. provider found. The patient is a 71year old woman being seen for a new diagnosis of right breast radial scar.    This was initially detected as an imaging abnormality on routine screening. Her work-up is detailed in the breast history section below. She has had regular annual mammogram; she did miss one during COVID. She denies any prior history of abnormal mammograms or breast biopsies. She denies any breast lumps, pain, skin changes, or nipple discharge. She denies any family history of breast or ovarian cancer.     8/10/2022 She presents today for follow-up.  Her pain is controlled.  She is getting back to her daily activities.  She has no concerns or complaints.    3/14/2023 She is here today for a follow-up. She denies any breast concerns, denies any masses or skin changes. She has not had  her annual mammogram yet.     3/13/2024 She presents today for a follow-up. She denies any new breast masses, skin changes, or nipple discharge. She is scheduled for her annual mammogram on 3/22/2024. She is tolerating Tamoxifen, taking 10mg every other day. She is considering proceeding with a colonoscopy.     TIMELINE OF WORKUP:  2/8/2022 Bilateral Screening Mammogram:   IMPRESSION/RECOMMENDATION(S):   Indeterminate right breast focal asymmetry with questioned distortion.  BI-RADS Category 0: Incomplete    3/15/2022 Right Breast Diagnostic Mammogram with Right Breast US:  With additional imaging there is partial persistence of the area of focal asymmetry in the posterior one third of the right breast located lateral to the plane of the nipple. The area partially resolves on spot compression 90-degree lateral imaging but appears more dense on spot compression CC imaging. There are microcalcifications seen in the region that have become more coarse when compared to prior examinations and have not increased in number or distribution when compared to prior examinations.    ULTRASOUND:   Targeted sonographic evaluation of the right breast was performed through the upper outer quadrant. At the 9 o'clock position on the order of 2 cm from the nipple there is a 0.9 x 0.4 x 0.3 cm ill-defined hypoechoic region surrounded by an area of increased echogenicity. No other abnormality is appreciated.  IMPRESSION:  1. There is a partially persistent area of asymmetry/density in the posterior one third of the right breast located lateral to the plane of the nipple. No sonographic correlate is appreciated. Correlation with a tomosynthesis guided right breast biopsy is recommended.  2. There is a 0.9 cm heterogenous ill-defined hypoechoic and hyperechoic region/lesion in the right breast at the 9 o'clock position on the order of 2 cm from the nipple. Correlation with an ultrasound-guided right breast biopsy is recommended.  BI-RADS  CATEGORY 4: Suspicious abnormality or suspicious finding. Biopsy should be considered.    4/19/2022 Right Breast US Guided and Stereotactic Guided Biopsy:   The lesion at the 9-o'clock position in the posterior one-third was visualized. Multiple tissue specimens were obtained. A barbell shaped metallic clip was placed to franko the site.   Preliminary sonography of the right breast was performed. The lesion at the 9-o'clock position on the order of 2 cm from the nipple was visualized. Multiple tissue specimens were obtained. A tri bell-shaped metallic clip was placed to franko the site.   Postbiopsy digital CC and 90 unilateral images were obtained and demonstrate the barbell-shaped and tri bell-shaped metallic clips at the respective biopsy sites in the right breast. No evidence for clip migration or for hematoma is appreciated.  The pathology result from the 9-o'clock posterior one-third Tomosynthesis biopsy has returned as a radial scar.   The pathology result from the 9-o'clock, 2 cm from nipple ultrasound guided biopsy has returned as benign hyalinized breast stroma and ducts.   Both are concordant with imaging findings.     4/19/2022 Pathology:   Final Diagnosis   1. Right Breast, 9 o'clock, Stereotatic Biopsies for Asymmetry:               A. Focal changes suggesting edge of benign radial scar.               B. No atypical hyperplasia, in situ nor invasive carcinoma identified.  2. Right Breast, 9 o'clock, 2 cm from Nipple, Stereotactic Biopsies for Asymmetry:               A. Benign hyalinized breast stroma and ducts.               B. No atypical hyperplasia, in situ nor invasive carcinoma identified.     7/28/2022 Right breast wire localized excisional biopsy:  Final Diagnosis   1. Breast, Right, Lumpectomy:  Breast tissue with                 A. Atypical lobular hyperplasia.               B. Tri-bell clip identified.                 C. Columnar cell change.               D. Biopsy site changes.                E. Microcalcifications and non-neoplastic tissue.               F.  Fibrous nodule.               G. Simple cyst.               H. Area consistent with portion of radial scar.               I.  Margins free of in-situ and invasive disease.               J. Clustered apocrine cysts  2. Breast, Right, Additional Superior Margin, Excision:  Breast tissue with                A. Margins free of in-situ and invasive disease.               B. Microcalcifications and non-neoplastic tissue.               C. Atypical lobular hyperplasia.     3/21/2023 Bilateral Screening Mammogram Tomosynthesis with CAD:  FINDINGS:   Scattered fibroglandular densities are seen throughout both breasts. There is postsurgical change of the right breast. In the anterior one-third of the left breast located slightly lateral to the plane of the nipple there is an area of focal asymmetry. I see no new or dominant masses, areas of architectural distortion or skin thickening. There is no evidence for axillary lymphadenopathy or nipple retraction.  IMPRESSION:  1. There is an area of focal asymmetry in the anterior one-third of the left breast located slightly latera to the plane of the nipple. Further evaluation with spot compression CC mammographic and tomosynthesis images and possible targeted left breast sonography is recommended.  2. There are no findings suspicious for malignancy in the right breast. Postsurgical change of the right breast is noted.  BI-RADS Category 0: Incomplete - Needs additional imaging evaluation.    4/18/2023 Left Diagnostic Mammogram Tomosynthesis with CAD:  FINDINGS:  MAMMOGRAM:   Left CC spot compression and lateral 2-D and Tomosynthesis views were obtained.    There are scattered areas of fibroglandular density.   The previously noted asymmetry in the anterior left breast effaces with spot compression with an appearance of normal breast parenchyma that is not significantly changed over multiple prior mammograms dating  back to 2017. No correlate is identified on the lateral view. Findings are consistent with superimposition of normal breast parenchyma.   IMPRESSION:  No mammographic evidence of malignancy in the left breast. Recommend annual screening mammogram in March 2024.   BI-RADS Category 1: Negative    9/12/2023 Bilateral Breast MRI:  FINDINGS:   There is scattered fibroglandular tissue. There is minimal background parenchymal enhancement.  RIGHT BREAST:    Benign-appearing postsurgical changes are identified in the right breast. No suspicious enhancing mass or area of non-mass enhancement is identified. The visualized axilla is within normal limits.  LEFT BREAST:    No suspicious enhancing mass or area of non-mass enhancement is identified. The visualized axilla is within normal limits.  EXTRAMAMMARY FINDINGS:  There are no pathologically enlarged internal mammary chain lymph nodes on either side.     IMPRESSION AND RECOMMENDATION:  No MRI evidence of malignancy in either breast. Recommend annual screening mammogram in March 2024.  BI-RADS Category 2: Benign    MEDICAL HISTORY:   Gynecologic History:   G:3. P:3 AB:0  Age at first childbirth: 27  Lactation/How long: x3  Age at menarche: 12  Age at menopause: 50s  Total years of oral contraceptive use: Previously  Total years of hormone replacement therapy: None    Past Medical History:   Past Medical History:   Diagnosis Date    Female stress incontinence     Hyperlipidemia     Hypertension     Hyperthyroidism     Medicare annual wellness visit, subsequent 02/28/2023    Radial scar of right breast       Past Surgical History:    Past Surgical History:   Procedure Laterality Date    BREAST BIOPSY Right 04/19/2022    BREAST LUMPECTOMY Right 7/28/2022    Procedure: right breast wire localized excisional biopsy of radial scar (9:00, 2 cm from the nipple, tri-bell clip);  Surgeon: Sera Quinones MD;  Location: [facility] OR [facility];  Service: General;  Laterality: Right;    D & C  HYSTEROSCOPY ENDOMETRIAL ABLATION      KNEE ARTHROSCOPY Bilateral     10/2000, 10/2002    LASER ABLATION      TOTAL KNEE ARTHROPLASTY Right 05/14/2012    TOTAL KNEE ARTHROPLASTY Left 12/17/2012    WOUND CLOSURE Right 06/27/2017    rt knee      Family History:    Family History   Problem Relation Age of Onset    Alzheimer's disease Mother     Cancer Father         prostate    Drug abuse Daughter     Breast cancer Neg Hx     Ovarian cancer Neg Hx     Malig Hyperthermia Neg Hx        Social History:   Social History     Socioeconomic History    Marital status:    Tobacco Use    Smoking status: Former     Current packs/day: 0.00     Average packs/day: 0.3 packs/day for 51.0 years (12.8 ttl pk-yrs)     Types: Cigarettes     Start date: 1970     Quit date: 2021     Years since quitting: 3.2     Passive exposure: Current    Smokeless tobacco: Never    Tobacco comments:     SOCIAL WITH WINE     -off and on smoking for 40 years    Vaping Use    Vaping status: Never Used   Substance and Sexual Activity    Alcohol use: Yes     Alcohol/week: 2.0 - 4.0 standard drinks of alcohol     Types: 2 - 4 Glasses of wine per week     Comment: SOCIAL    Drug use: Never    Sexual activity: Yes     Partners: Male      Allergies:   Allergies   Allergen Reactions    Penicillins Anaphylaxis     Beta lactam allergy details  Antibiotic reaction: (!) shortness of breath, swollen tongue  Age at reaction: child  Dose to reaction time: unknown  Reason for antibiotic: unknown  Epinephrine required for reaction?: unknown  Tolerated antibiotics: unknown        Morphine Nausea And Vomiting     Medications:     Current Outpatient Medications:     alendronate (FOSAMAX) 70 MG tablet, TAKE 1 TABLET BY MOUTH EVERY 7 DAYS., Disp: 12 tablet, Rfl: 3    atorvastatin (LIPITOR) 40 MG tablet, TAKE 1 TABLET BY MOUTH EVERY DAY AT NIGHT, Disp: 90 tablet, Rfl: 3    CALCIUM PO, Take 1,000 mg by mouth. Patient takes 2 a day, Disp: , Rfl:     levothyroxine  (Synthroid) 100 MCG tablet, Take 1 tablet by mouth Every Morning., Disp: 90 tablet, Rfl: 0    losartan (COZAAR) 100 MG tablet, TAKE 1 TABLET BY MOUTH EVERY DAY, Disp: 90 tablet, Rfl: 3    oxybutynin XL (DITROPAN XL) 15 MG 24 hr tablet, Take 1 tablet by mouth Daily., Disp: 30 tablet, Rfl: 2    VITAMIN D, CHOLECALCIFEROL, PO, Take 2,000 Units by mouth Daily., Disp: , Rfl:     tamoxifen (NOLVADEX) 10 MG tablet, Take 0.5 tablets by mouth Daily., Disp: 45 tablet, Rfl: 3    Labs:    3/1/2024 HgbA1C 5.70    ROS:   Constitutional: Negative for fevers or chills  HENT: Negative for hearing loss or runny nose  Eyes: Negative for vision changes or scleral icterus  Respiratory: Negative for cough or shortness of breath  Cardiovascular: Negative for chest pain or heart palpitations  Gastrointestinal: Negative for abdominal pain, nausea, vomiting, constipation, melena, or hematochezia  Genitourinary: Negative for hematuria or dysuria  Musculoskeletal: Negative for joint swelling or gait instability  Neurologic: Negative for tremors or seizures  Psychiatric: Negative for suicidal ideations or depression  All other systems reviewed and negative    PHYSICAL EXAM:   Constitutional: Well-developed, well-nourished, no acute distress  Eyes: Conjunctiva normal, sclera nonicteric  ENMT: Hearing grossly normal, oral mucosa moist  Neck: Supple, trachea midline  Respiratory: Clear to auscultation bilaterally, normal inspiratory effort   Cardiovascular: Regular rate, no peripheral edema, no jugular venous distention  Breast: symmetric  Right: No visible abnormalities on inspection while seated, with arms raised, or hands on hips. No masses, skin changes, or nipple abnormalities.  Right breast periareolar incision healed.  Left: No visible abnormalities on inspection while seated, with arms raised, or hands on hips. No masses, skin changes, or nipple abnormalities.   No clinical chest wall involvement.  Lymphatics (palpable nodes): No cervical,  supraclavicular, or axillary lymphadenopathy  Skin: Warm, dry, no rash on visualized skin surfaces  Musculoskeletal: Symmetric strength, normal gait  Psychiatric: Alert and oriented ×3, normal affect     Cheri Husain PA-C    [facility] General Surgery   [address], [address]  [place] [address]    [address]  [place] [address]    Office: [phone]  Fax: [phone]

## 2024-03-14 ENCOUNTER — OFFICE VISIT (OUTPATIENT)
Dept: ONCOLOGY | Facility: CLINIC | Age: 74
End: 2024-03-14
Payer: MEDICARE

## 2024-03-14 VITALS
TEMPERATURE: 98 F | WEIGHT: 181 LBS | HEIGHT: 63 IN | OXYGEN SATURATION: 96 % | RESPIRATION RATE: 16 BRPM | DIASTOLIC BLOOD PRESSURE: 74 MMHG | BODY MASS INDEX: 32.07 KG/M2 | SYSTOLIC BLOOD PRESSURE: 121 MMHG | HEART RATE: 70 BPM

## 2024-03-14 DIAGNOSIS — Z79.810 LONG-TERM CURRENT USE OF TAMOXIFEN: ICD-10-CM

## 2024-03-14 DIAGNOSIS — N64.89 RADIAL SCAR OF RIGHT BREAST: Primary | ICD-10-CM

## 2024-03-14 DIAGNOSIS — E03.9 ADULT HYPOTHYROIDISM: ICD-10-CM

## 2024-03-14 RX ORDER — TAMOXIFEN CITRATE 10 MG/1
5 TABLET ORAL DAILY
Qty: 45 TABLET | Refills: 3 | Status: SHIPPED | OUTPATIENT
Start: 2024-03-14

## 2024-03-22 ENCOUNTER — HOSPITAL ENCOUNTER (OUTPATIENT)
Dept: MAMMOGRAPHY | Facility: HOSPITAL | Age: 74
Discharge: HOME | End: 2024-03-22
Payer: MEDICARE

## 2024-03-22 DIAGNOSIS — Z12.31 ENCOUNTER FOR SCREENING MAMMOGRAM FOR MALIGNANT NEOPLASM OF BREAST: ICD-10-CM

## 2024-03-22 PROCEDURE — 77063 BREAST TOMOSYNTHESIS BI: CPT

## 2024-03-22 PROCEDURE — 77067 SCR MAMMO BI INCL CAD: CPT

## 2024-03-28 DIAGNOSIS — N64.89 RADIAL SCAR OF RIGHT BREAST: ICD-10-CM

## 2024-03-28 DIAGNOSIS — N60.91 ATYPICAL LOBULAR HYPERPLASIA (ALH) OF RIGHT BREAST: ICD-10-CM

## 2024-03-28 DIAGNOSIS — Z91.89 AT HIGH RISK FOR BREAST CANCER: Primary | ICD-10-CM

## 2024-03-29 ENCOUNTER — TELEPHONE (OUTPATIENT)
Dept: SURGERY | Facility: CLINIC | Age: 74
End: 2024-03-29
Payer: MEDICARE

## 2024-03-29 NOTE — TELEPHONE ENCOUNTER
----- Message from Cheri Husain PA-C sent at 3/28/2024  2:26 PM EDT -----  Regarding: Results  Hi,     Can you please call her and let her know that her recent screening mammogram was benign?  Would recommend repeat mammogram in one year.    Her breast MRI is due 09/2024.  I have placed an order.  I can call her with those results.  She should keep her one year appointment with me next year. If she has any questions or concerns, please let me know.    ---  3/22/2024 Bilateral Screening Mammogram Tomosynthesis:  IMPRESSION:  1. There is no evidence for malignancy or significant change in either breast. Right breast postsurgical changes appreciated. Routine followup mammography is recommended.  BI-RADS category 2: Benign.    Thanks,  Cheri

## 2024-04-17 LAB — TSH SERPL DL<=0.005 MIU/L-ACNC: 4.19 UIU/ML (ref 0.27–4.2)

## 2024-04-26 DIAGNOSIS — E03.9 ADULT HYPOTHYROIDISM: ICD-10-CM

## 2024-04-26 RX ORDER — LEVOTHYROXINE SODIUM 0.1 MG/1
100 TABLET ORAL
Qty: 90 TABLET | Refills: 0 | Status: SHIPPED | OUTPATIENT
Start: 2024-04-26

## 2024-04-26 NOTE — TELEPHONE ENCOUNTER
Caller: Jessi Ruby Jo    Relationship: Self    Best call back number: [phone]     Requested Prescriptions:   Requested Prescriptions     Pending Prescriptions Disp Refills    levothyroxine (Synthroid) 100 MCG tablet 90 tablet 0     Sig: Take 1 tablet by mouth Every Morning.        Pharmacy where request should be sent: [facility]/PHARMACY #[id number] [place], [place] - [address] [address] [facility] AT [address] [facility] [phone] [facility] [phone]      Last office visit with prescribing clinician: 3/1/2024   Last telemedicine visit with prescribing clinician: Visit date not found   Next office visit with prescribing clinician: 3/4/2025     Does the patient have less than a 3 day supply:  [] Yes  [] No    Would you like a call back once the refill request has been completed: [] Yes [x] No    If the office needs to give you a call back, can they leave a voicemail: [] Yes [x] No    Alex Sandoval Rep   04/26/24 09:49 EDT

## 2024-05-26 DIAGNOSIS — N32.81 OVERACTIVE BLADDER: ICD-10-CM

## 2024-05-26 DIAGNOSIS — N95.1 VASOMOTOR SYMPTOMS DUE TO MENOPAUSE: ICD-10-CM

## 2024-05-28 RX ORDER — OXYBUTYNIN CHLORIDE 15 MG/1
15 TABLET, EXTENDED RELEASE ORAL DAILY
Qty: 90 TABLET | Refills: 1 | Status: SHIPPED | OUTPATIENT
Start: 2024-05-28

## 2024-08-25 DIAGNOSIS — E03.9 ADULT HYPOTHYROIDISM: ICD-10-CM

## 2024-08-26 RX ORDER — LEVOTHYROXINE SODIUM 100 UG/1
100 TABLET ORAL EVERY MORNING
Qty: 90 TABLET | Refills: 0 | Status: SHIPPED | OUTPATIENT
Start: 2024-08-26

## 2024-09-16 ENCOUNTER — HOSPITAL ENCOUNTER (OUTPATIENT)
Dept: MRI IMAGING | Facility: HOSPITAL | Age: 74
Discharge: HOME | End: 2024-09-16
Payer: MEDICARE

## 2024-09-16 DIAGNOSIS — Z91.89 AT HIGH RISK FOR BREAST CANCER: ICD-10-CM

## 2024-09-16 DIAGNOSIS — N60.91 ATYPICAL LOBULAR HYPERPLASIA (ALH) OF RIGHT BREAST: ICD-10-CM

## 2024-09-16 DIAGNOSIS — N64.89 RADIAL SCAR OF RIGHT BREAST: ICD-10-CM

## 2024-09-16 PROCEDURE — A9577 INJ MULTIHANCE: HCPCS

## 2024-09-16 PROCEDURE — C8937 CAD BREAST MRI: HCPCS

## 2024-09-16 PROCEDURE — 0 GADOBENATE DIMEGLUMINE 529 MG/ML SOLUTION

## 2024-09-16 PROCEDURE — C8908 MRI W/O FOL W/CONT, BREAST,: HCPCS

## 2024-09-16 RX ADMIN — GADOBENATE DIMEGLUMINE 17 ML: 529 INJECTION, SOLUTION INTRAVENOUS at 11:27

## 2024-09-23 ENCOUNTER — TELEPHONE (OUTPATIENT)
Dept: SURGERY | Facility: CLINIC | Age: 74
End: 2024-09-23
Payer: MEDICARE

## 2024-10-07 ENCOUNTER — TELEPHONE (OUTPATIENT)
Dept: FAMILY MEDICINE CLINIC | Facility: CLINIC | Age: 74
End: 2024-10-07
Payer: MEDICARE

## 2024-10-07 ENCOUNTER — PATIENT ROUNDING (BHMG ONLY) (OUTPATIENT)
Dept: URGENT CARE | Facility: CLINIC | Age: 74
End: 2024-10-07
Payer: MEDICARE

## 2024-10-07 ENCOUNTER — TELEPHONE (OUTPATIENT)
Dept: ONCOLOGY | Facility: CLINIC | Age: 74
End: 2024-10-07
Payer: MEDICARE

## 2024-10-07 NOTE — TELEPHONE ENCOUNTER
Patient was seen in urgent care over the weekend for a right ulna fracture. The doctor placed a referral to see a specialist but instructed patient to contact PCP to expedite referral as patient is in a lot of pain. Please advise.

## 2024-10-07 NOTE — ED NOTES
Thank you for letting us care for you at your recent visit to [facility]. We would love to hear about your experience with us. Was this the first time you have visited our location?    We’re always looking for ways to make our patients’ experience even better. Do you have any recommendations on ways we may improve?     Please be on the lookout for a survey about your recent visit from Norbert Lawrence via text or email. We would greatly appreciate if you could fill that out and turn it back in. We want your voice to be heard and we value your feedback.     Thank you for choosing [facility] for your healthcare needs. I appreciate you taking the time to respond!

## 2024-10-07 NOTE — TELEPHONE ENCOUNTER
Caller: Jessi Ruby    Relationship to patient: Self    Best call back number: [phone]    Chief complaint: BROKE HER ARM     Type of visit: VITALS & F/U 1     Requested date: WILL CALL BACK TO R/S     If rescheduling, when is the original appointment: 10/10/2024    Additional notes:

## 2024-10-08 ENCOUNTER — HOSPITAL ENCOUNTER (OUTPATIENT)
Dept: CARDIOLOGY | Facility: HOSPITAL | Age: 74
Discharge: HOME | End: 2024-10-08
Payer: MEDICARE

## 2024-10-08 ENCOUNTER — LAB (OUTPATIENT)
Dept: LAB | Facility: HOSPITAL | Age: 74
End: 2024-10-08
Payer: MEDICARE

## 2024-10-08 ENCOUNTER — TRANSCRIBE ORDERS (OUTPATIENT)
Dept: ADMINISTRATIVE | Facility: HOSPITAL | Age: 74
End: 2024-10-08
Payer: MEDICARE

## 2024-10-08 DIAGNOSIS — Z01.818 PRE-OP TESTING: ICD-10-CM

## 2024-10-08 DIAGNOSIS — Z01.818 PRE-OP TESTING: Primary | ICD-10-CM

## 2024-10-08 LAB
ANION GAP SERPL CALCULATED.3IONS-SCNC: 10.8 MMOL/L (ref 5–15)
BUN SERPL-MCNC: 12 MG/DL (ref 8–23)
BUN/CREAT SERPL: 16.7 (ref 7–25)
CALCIUM SPEC-SCNC: 9.4 MG/DL (ref 8.6–10.5)
CHLORIDE SERPL-SCNC: 106 MMOL/L (ref 98–107)
CO2 SERPL-SCNC: 26.2 MMOL/L (ref 22–29)
CREAT SERPL-MCNC: 0.72 MG/DL (ref 0.57–1)
EGFRCR SERPLBLD CKD-EPI 2021: 87.3 ML/MIN/1.73
GLUCOSE SERPL-MCNC: 92 MG/DL (ref 65–99)
POTASSIUM SERPL-SCNC: 4 MMOL/L (ref 3.5–5.2)
QT INTERVAL: 425 MS
QTC INTERVAL: 462 MS
SODIUM SERPL-SCNC: 143 MMOL/L (ref 136–145)

## 2024-10-08 PROCEDURE — 36415 COLL VENOUS BLD VENIPUNCTURE: CPT

## 2024-10-08 PROCEDURE — 93010 ELECTROCARDIOGRAM REPORT: CPT | Performed by: INTERNAL MEDICINE

## 2024-10-08 PROCEDURE — 80048 BASIC METABOLIC PNL TOTAL CA: CPT

## 2024-10-08 PROCEDURE — 93005 ELECTROCARDIOGRAM TRACING: CPT | Performed by: PLASTIC SURGERY

## 2024-10-08 NOTE — TELEPHONE ENCOUNTER
It looks like she has been referred to Dr. Zarate and this has already been authorized. Was sent to be scheduled next week. Has this been scheduled? Does she need a new referral elsewhere?

## 2024-11-20 DIAGNOSIS — E03.9 ADULT HYPOTHYROIDISM: ICD-10-CM

## 2024-11-20 DIAGNOSIS — N95.1 VASOMOTOR SYMPTOMS DUE TO MENOPAUSE: ICD-10-CM

## 2024-11-20 DIAGNOSIS — N32.81 OVERACTIVE BLADDER: ICD-10-CM

## 2024-11-20 RX ORDER — LEVOTHYROXINE SODIUM 100 UG/1
100 TABLET ORAL EVERY MORNING
Qty: 90 TABLET | Refills: 0 | Status: SHIPPED | OUTPATIENT
Start: 2024-11-20

## 2024-11-20 RX ORDER — OXYBUTYNIN CHLORIDE 15 MG/1
15 TABLET, EXTENDED RELEASE ORAL DAILY
Qty: 90 TABLET | Refills: 1 | Status: SHIPPED | OUTPATIENT
Start: 2024-11-20

## 2024-11-21 ENCOUNTER — OFFICE VISIT (OUTPATIENT)
Dept: ONCOLOGY | Facility: CLINIC | Age: 74
End: 2024-11-21
Payer: MEDICARE

## 2024-11-21 VITALS
HEART RATE: 68 BPM | SYSTOLIC BLOOD PRESSURE: 135 MMHG | RESPIRATION RATE: 16 BRPM | HEIGHT: 63 IN | WEIGHT: 183.6 LBS | OXYGEN SATURATION: 97 % | BODY MASS INDEX: 32.53 KG/M2 | DIASTOLIC BLOOD PRESSURE: 76 MMHG | TEMPERATURE: 97.2 F

## 2024-11-21 DIAGNOSIS — Z12.31 VISIT FOR SCREENING MAMMOGRAM: ICD-10-CM

## 2024-11-21 DIAGNOSIS — N64.89 RADIAL SCAR OF RIGHT BREAST: Primary | ICD-10-CM

## 2024-11-21 NOTE — PROGRESS NOTES
Subjective   Jessi Ruby is a 74 y.o. female.  Referred by Cheri Husain for right breast atypical lobular hyperplasia    History of Present Illness   Ms. Ruby is a 72-year-old postmenopausal  lady who presented with a screen detected abnormality of the right breast in 2022 2/8/2022-bilateral screening mammogram  Indeterminate right breast focal asymmetry with questioned distortion.  Further evaluation recommended.    3/15/2022-right breast diagnostic mammogram and ultrasound  Partially persistent area of asymmetry/density in the posterior one third of the right breast located lateral to the plane of the nipple.  No sonographic correlate appreciated.  0.9 cm heterogeneous ill-defined hypoechoic and hyperechoic lesion in the right breast at 9:00, 2 cm from the nipple.  Ultrasound-guided right breast biopsy recommended.    4/19/2022  1.right breast 9:00, stereotactic biopsy-focal changes suggestive of benign radial scar.  No atypical hyperplasia, in situ nor invasive carcinoma.  2.right breast 9:00, 2 cm from the nipple-stereotactic biopsies  Benign hyalinized breast stroma and ducts.  No atypical hyperplasia, in situ or invasive carcinoma.    7/28/2022-right breast lumpectomy  1.atypical lobular hyperplasia  Area consistent with portion of radial scar  Margins are free of in situ and invasive carcinoma  2.right breast additional superior margin  Margins free of in situ or invasive carcinoma  Atypical lobular hyperplasia.    She was seen in the surgery clinic on 3/19/2023 and referred for discussing breast reduction.    Tyrer-Cuzick lifetime risk noted to be 16%.    Karen model 5-year risk noted to be 4.5% as opposed to average risk of 2.2%.  Lifetime risk noted to be 11.3% as opposed to 5.7% average risk.    Ms. Bermudez does have diagnosis of osteoporosis for which she is on Fosamax.  She also has stress incontinence.    Other comorbidities include hypertension, hypothyroidism.    3/21/2023-bilateral  screening mammogram-area of focal asymmetry in the anterior one third of the left breast slightly lateral to the plane of the nipple.  Further evaluation recommended.  No suspicious findings in the right breast.    DEXA 12/21/2022 shows osteopenia with a T score of -2.1.    Interval history  Jessi returns today for 6-month follow-up.  She continues on tamoxifen every other day.  She is tolerating that well except for hot flashes which are predominantly through the day.  She does struggle with insomnia but this is chronic and unchanged.    She fell a few days ago and injured her right wrist requiring surgery.  She is currently in a brace for that.    She denies any new breast masses.  Up-to-date on screening mammograms and MRIs.      The following portions of the patient's history were reviewed and updated as appropriate: allergies, current medications, past family history, past medical history, past social history, past surgical history and problem list.    Past Medical History:   Diagnosis Date    Female stress incontinence     Hyperlipidemia     Hypertension     Hyperthyroidism     Medicare annual wellness visit, subsequent 02/28/2023    Radial scar of right breast         Past Surgical History:   Procedure Laterality Date    BREAST BIOPSY Right 04/19/2022    BREAST LUMPECTOMY Right 7/28/2022    Procedure: right breast wire localized excisional biopsy of radial scar (9:00, 2 cm from the nipple, tri-bell clip);  Surgeon: Sera Quinones MD;  Location: [facility] [place];  Service: General;  Laterality: Right;    D & C HYSTEROSCOPY ENDOMETRIAL ABLATION      KNEE ARTHROSCOPY Bilateral     10/2000, 10/2002    LASER ABLATION      TOTAL KNEE ARTHROPLASTY Right 05/14/2012    TOTAL KNEE ARTHROPLASTY Left 12/17/2012    WOUND CLOSURE Right 06/27/2017    rt knee        Family History   Problem Relation Age of Onset    Alzheimer's disease Mother     Cancer Father         prostate    Drug abuse Daughter     Breast cancer Neg Hx      Ovarian cancer Neg Hx     Malig Hyperthermia Neg Hx    She has 5 sisters and 2 brothers.  Her father had prostate cancer.  No other family members with any malignancies.    Social History     Socioeconomic History    Marital status:    Tobacco Use    Smoking status: Former     Current packs/day: 0.00     Average packs/day: 0.3 packs/day for 51.0 years (12.8 ttl pk-yrs)     Types: Cigarettes     Start date:      Quit date:      Years since quitting: 3.8     Passive exposure: Current    Smokeless tobacco: Never    Tobacco comments:     SOCIAL WITH WINE     -off and on smoking for 40 years    Vaping Use    Vaping status: Never Used   Substance and Sexual Activity    Alcohol use: Yes     Alcohol/week: 2.0 - 4.0 standard drinks of alcohol     Types: 2 - 4 Glasses of wine per week     Comment: SOCIAL    Drug use: Never    Sexual activity: Yes     Partners: Male        OB History          3    Para   3    Term   3            AB        Living             SAB        IAB        Ectopic        Molar        Multiple        Live Births                   Age at menarche-12  Age at first live childbirth-27   3 para 3  0  Oral contraceptive pill use when she was younger  No use of hormone replacement therapy  Age at menopause-50    Allergies   Allergen Reactions    Penicillins Anaphylaxis     Beta lactam allergy details  Antibiotic reaction: (!) shortness of breath, swollen tongue  Age at reaction: child  Dose to reaction time: unknown  Reason for antibiotic: unknown  Epinephrine required for reaction?: unknown  Tolerated antibiotics: unknown        Morphine Nausea And Vomiting            Review of Systems   Constitutional: Negative.    HENT: Negative.     Eyes: Negative.    Respiratory: Negative.     Cardiovascular: Negative.    Gastrointestinal: Negative.    Endocrine: Negative.    Genitourinary: Negative.  Positive for amenorrhea.   Musculoskeletal: Negative.    Allergic/Immunologic:  "Negative.    Neurological: Negative.    Hematological: Negative.    Psychiatric/Behavioral: Negative.         Review of systems as mentioned HPI otherwise negative    Objective   Blood pressure 135/76, pulse 68, temperature 97.2 °F (36.2 °C), temperature source Oral, resp. rate 16, height 160 cm (62.99\"), weight 83.3 kg (183 lb 9.6 oz), SpO2 97%, not currently breastfeeding.     Physical Exam  Constitutional:       Appearance: Normal appearance.   HENT:      Right Ear: External ear normal.      Left Ear: External ear normal.   Eyes:      Conjunctiva/sclera: Conjunctivae normal.   Cardiovascular:      Rate and Rhythm: Normal rate.   Pulmonary:      Effort: Pulmonary effort is normal.   Abdominal:      General: Abdomen is flat.   Musculoskeletal:         General: Normal range of motion.   Skin:     General: Skin is warm.   Neurological:      General: No focal deficit present.      Mental Status: She is alert and oriented to person, place, and time.   Psychiatric:         Mood and Affect: Mood normal.         Behavior: Behavior normal.         Thought Content: Thought content normal.         Judgment: Judgment normal.       Declined breast Exam today (had breast exam with surgeon yesterday): Previous right breast appears normal inspection with well-healed scar.  No palpable abnormalities of the right breast.  Left breast appears normal inspection.  No palpable abnormalities of the left breast.    I have reexamined the patient and the results are consistent with the previously documented exam. Mariela Avalos MD      No visits with results within 30 Day(s) from this visit.   Latest known visit with results is:   Hospital Outpatient Visit on 10/08/2024   Component Date Value Ref Range Status    QT Interval 10/08/2024 425  ms Final    QTC Interval 10/08/2024 462  ms Final        No radiology results for the last 30 days.         Assessment & Plan       *Atypical lobular hyperplasia  Deshaun-Jorge L lifetime risk estimated " to be 16%, Karen model lifetime risk estimate noted to be 11.3% and 5-year risk 4.5%.  This is increased compared to average woman her age.  I explained to her that there is increased risk of breast cancer associated with ALH and the risk is anywhere from 2-5 fold.  The breast cancer risk is increased not only in the ipsilateral but also on the contralateral breast.  Given that she is in her 70s her lifetime risk is elevated but not similar to if she was younger.  There would be a benefit from endocrine therapy such as tamoxifen or aromatase inhibitors.  However it would be important to consider the side effects.  Given that she has stress incontinence and osteopenia I would not recommend aromatase inhibitors.  We discussed the side effects of tamoxifen 20 mg including but not limited to hot flashes, mood changes, fatigue, nausea, increased risk of DVT PE, risk of stroke, increased risk of uterine cancer.  We also discussed the 5 mg dosing of tamoxifen for prevention and the fact that it is associated with less side effects.  March 2023-low-dose tamoxifen started  Screening mammogram 3/21/2023 showing benign right breast and focal asymmetry in the left breast recommending diagnostic mammogram of the left breast.  Diagnostic mammogram left breast 4/18/2023 benign.  Screening mammogram March 2024 benign, bilateral breast MRI September 2024 benign.  She continues to tolerate tamoxifen well.  No evidence of malignancy.    *Left breast abnormality  4/18/2023-left breast diagnostic mammogram benign  Recent breast MRI from September 2024 benign    *Osteopenia  Recent DEXA from December 2022 suggestive of osteopenia with a T score of -2.1  Continue Fosamax  Continue calcium and vitamin D  Recent fall and fracture of the right wrist.    *Hyperlipidemia-continue atorvastatin    PLAN:   Continue low-dose tamoxifen, 10 mg every other day.   Screening mammogram due March 2025, ordered and scheduled today  Bilateral breast MRI due  September 2025, ordered and scheduled today  APRN in 6 months and MD in 1 year    The patient is on high risk medication that requires close monitoring for toxicity.

## 2025-01-15 DIAGNOSIS — E78.5 HYPERLIPIDEMIA, UNSPECIFIED HYPERLIPIDEMIA TYPE: ICD-10-CM

## 2025-01-15 RX ORDER — ATORVASTATIN CALCIUM 40 MG/1
40 TABLET, FILM COATED ORAL
Qty: 90 TABLET | Refills: 3 | Status: SHIPPED | OUTPATIENT
Start: 2025-01-15

## 2025-01-16 DIAGNOSIS — M81.0 HIGH RISK FOR FRACTURE DUE TO OSTEOPOROSIS BY DEXA SCAN: ICD-10-CM

## 2025-01-16 RX ORDER — ALENDRONATE SODIUM 70 MG/1
70 TABLET ORAL
Qty: 12 TABLET | Refills: 3 | Status: SHIPPED | OUTPATIENT
Start: 2025-01-16

## 2025-01-16 NOTE — TELEPHONE ENCOUNTER
Rx Refill Note  Requested Prescriptions     Pending Prescriptions Disp Refills    alendronate (FOSAMAX) 70 MG tablet [Pharmacy Med Name: ALENDRONATE SODIUM 70 MG TAB] 12 tablet 3     Sig: TAKE 1 TABLET BY MOUTH ONE TIME PER WEEK      Last office visit with prescribing clinician: 3/1/2024   Last telemedicine visit with prescribing clinician: Visit date not found   Next office visit with prescribing clinician: 3/4/2025                         Would you like a call back once the refill request has been completed: [] Yes [] No    If the office needs to give you a call back, can they leave a voicemail: [] Yes [] No    Juanito Sosa CMA/LMR  01/16/25, 07:37 EST

## 2025-02-05 DIAGNOSIS — I10 PRIMARY HYPERTENSION: ICD-10-CM

## 2025-02-05 RX ORDER — LOSARTAN POTASSIUM 100 MG/1
TABLET ORAL
Qty: 90 TABLET | Refills: 3 | Status: SHIPPED | OUTPATIENT
Start: 2025-02-05

## 2025-02-14 DIAGNOSIS — E03.9 ADULT HYPOTHYROIDISM: ICD-10-CM

## 2025-02-14 RX ORDER — LEVOTHYROXINE SODIUM 100 UG/1
100 TABLET ORAL EVERY MORNING
Qty: 90 TABLET | Refills: 0 | Status: SHIPPED | OUTPATIENT
Start: 2025-02-14

## 2025-03-04 ENCOUNTER — OFFICE VISIT (OUTPATIENT)
Dept: FAMILY MEDICINE CLINIC | Facility: CLINIC | Age: 75
End: 2025-03-04
Payer: MEDICARE

## 2025-03-04 VITALS
DIASTOLIC BLOOD PRESSURE: 80 MMHG | TEMPERATURE: 97.3 F | SYSTOLIC BLOOD PRESSURE: 146 MMHG | HEART RATE: 80 BPM | HEIGHT: 63 IN | OXYGEN SATURATION: 98 % | BODY MASS INDEX: 34.02 KG/M2 | WEIGHT: 192 LBS

## 2025-03-04 DIAGNOSIS — Z13.0 SCREENING FOR DEFICIENCY ANEMIA: ICD-10-CM

## 2025-03-04 DIAGNOSIS — Z00.00 MEDICARE ANNUAL WELLNESS VISIT, SUBSEQUENT: Primary | ICD-10-CM

## 2025-03-04 DIAGNOSIS — Z78.0 POSTMENOPAUSAL: ICD-10-CM

## 2025-03-04 DIAGNOSIS — R73.9 ELEVATED RANDOM BLOOD GLUCOSE LEVEL: ICD-10-CM

## 2025-03-04 DIAGNOSIS — E03.9 ADULT HYPOTHYROIDISM: ICD-10-CM

## 2025-03-04 DIAGNOSIS — Z13.6 SCREENING FOR ISCHEMIC HEART DISEASE: ICD-10-CM

## 2025-03-04 PROCEDURE — G0439 PPPS, SUBSEQ VISIT: HCPCS | Performed by: STUDENT IN AN ORGANIZED HEALTH CARE EDUCATION/TRAINING PROGRAM

## 2025-03-04 PROCEDURE — 3079F DIAST BP 80-89 MM HG: CPT | Performed by: STUDENT IN AN ORGANIZED HEALTH CARE EDUCATION/TRAINING PROGRAM

## 2025-03-04 PROCEDURE — 1126F AMNT PAIN NOTED NONE PRSNT: CPT | Performed by: STUDENT IN AN ORGANIZED HEALTH CARE EDUCATION/TRAINING PROGRAM

## 2025-03-04 PROCEDURE — 1159F MED LIST DOCD IN RCRD: CPT | Performed by: STUDENT IN AN ORGANIZED HEALTH CARE EDUCATION/TRAINING PROGRAM

## 2025-03-04 PROCEDURE — 1170F FXNL STATUS ASSESSED: CPT | Performed by: STUDENT IN AN ORGANIZED HEALTH CARE EDUCATION/TRAINING PROGRAM

## 2025-03-04 PROCEDURE — 1160F RVW MEDS BY RX/DR IN RCRD: CPT | Performed by: STUDENT IN AN ORGANIZED HEALTH CARE EDUCATION/TRAINING PROGRAM

## 2025-03-04 PROCEDURE — 3077F SYST BP >= 140 MM HG: CPT | Performed by: STUDENT IN AN ORGANIZED HEALTH CARE EDUCATION/TRAINING PROGRAM

## 2025-03-04 NOTE — ASSESSMENT & PLAN NOTE
Colonoscopy: will schedule with Dr. Quinones  Mammogram: history of lumpectomy, following with oncology/breast surg  LDCT: former smoker  DEXA: last 12/2022 with osteopenia, on fosamax since 2023, repeat due.    Immunizations: eligible for Shingrex  Labs: ordered today, on statin  The 10-year ASCVD risk score (Werner DIANA, et al., 2019) is: 25.7%    Values used to calculate the score:      Age: 74 years      Sex: Female      Is Non- : No      Diabetic: No      Tobacco smoker: No      Systolic Blood Pressure: 146 mmHg      Is BP treated: Yes      HDL Cholesterol: 36 mg/dL      Total Cholesterol: 147 mg/dL        Patient was counseled in regards to maintaining a healthy lifestyle, rich in whole grains, fruits and vegetables. Limit high saturated fats and processed sugars. Maintain an active lifestyle to promote overall health and well being.

## 2025-03-04 NOTE — PROGRESS NOTES
Subjective   The ABCs of the Annual Wellness Visit  Medicare Wellness Visit      Jessi Ruby is a 74 y.o. patient who presents for a Medicare Wellness Visit.    The following portions of the patient's history were reviewed and   updated as appropriate: allergies, current medications, past family history, past medical history, past social history, past surgical history, and problem list.    Compared to one year ago, the patient's physical   health is the same.  Compared to one year ago, the patient's mental   health is the same.    Recent Hospitalizations:  She was not admitted to the hospital during the last year.     Current Medical Providers:  Patient Care Team:  Ritika Chang MD as PCP - General (Family Medicine)  Cheri Husain PA-C as Referring Physician (Physician Assistant)    Outpatient Medications Prior to Visit   Medication Sig Dispense Refill    alendronate (FOSAMAX) 70 MG tablet TAKE 1 TABLET BY MOUTH ONE TIME PER WEEK 12 tablet 3    atorvastatin (LIPITOR) 40 MG tablet TAKE 1 TABLET BY MOUTH EVERY DAY AT NIGHT 90 tablet 3    CALCIUM PO Take 1,000 mg by mouth. Patient takes 2 a day      levothyroxine (SYNTHROID, LEVOTHROID) 100 MCG tablet TAKE 1 TABLET BY MOUTH EVERY DAY IN THE MORNING 90 tablet 0    losartan (COZAAR) 100 MG tablet TAKE 1 TABLET BY MOUTH EVERY DAY 90 tablet 3    oxybutynin XL (DITROPAN XL) 15 MG 24 hr tablet TAKE 1 TABLET BY MOUTH EVERY DAY 90 tablet 1    tamoxifen (NOLVADEX) 10 MG tablet Take 0.5 tablets by mouth Daily. 45 tablet 3    VITAMIN D, CHOLECALCIFEROL, PO Take 2,000 Units by mouth Daily.       No facility-administered medications prior to visit.     No opioid medication identified on active medication list. I have reviewed chart for other potential  high risk medication/s and harmful drug interactions in the elderly.      Aspirin is not on active medication list.  Aspirin use is not indicated based on review of current medical condition/s. Risk of harm outweighs  "potential benefits.  .    Patient Active Problem List   Diagnosis    Hyperlipidemia    Hypertension    Adult hypothyroidism    Cannot sleep    Breast cancer screening    At risk for dental problems    Colon cancer screening    Pap test, as part of routine gynecological examination    Acute pain of right shoulder    Radial scar of right breast    S/P knee replacement    Medicare annual wellness visit, subsequent     Advance Care Planning Advance Directive is not on file.  ACP discussion was held with the patient during this visit. Patient has an advance directive (not in EMR), copy requested.            Objective   Vitals:    25 0954   BP: 146/80   Pulse: 80   Temp: 97.3 °F (36.3 °C)   SpO2: 98%   Weight: 87.1 kg (192 lb)   Height: 160 cm (63\")   PainSc: 0-No pain       Estimated body mass index is 34.01 kg/m² as calculated from the following:    Height as of this encounter: 160 cm (63\").    Weight as of this encounter: 87.1 kg (192 lb).    BMI is >= 30 and <35. (Class 1 Obesity). The following options were offered after discussion;: exercise counseling/recommendations and nutrition counseling/recommendations           Does the patient have evidence of cognitive impairment? No                                                                                               Health  Risk Assessment    Smoking Status:  Social History     Tobacco Use   Smoking Status Former    Current packs/day: 0.00    Average packs/day: 0.3 packs/day for 51.0 years (12.8 ttl pk-yrs)    Types: Cigarettes    Start date:     Quit date:     Years since quittin.1    Passive exposure: Current   Smokeless Tobacco Never   Tobacco Comments    SOCIAL WITH WINE    -off and on smoking for 40 years      Alcohol Consumption:  Social History     Substance and Sexual Activity   Alcohol Use Yes    Alcohol/week: 2.0 - 4.0 standard drinks of alcohol    Types: 2 - 4 Glasses of wine per week    Comment: SOCIAL       Fall Risk Screen  STEADI " Fall Risk Assessment was completed, and patient is at HIGH risk for falls. Assessment completed on:3/4/2025    Depression Screening   Little interest or pleasure in doing things? Not at all   Feeling down, depressed, or hopeless? Not at all   PHQ-2 Total Score 0      Health Habits and Functional and Cognitive Screening:      3/4/2025     9:59 AM   Functional & Cognitive Status   Do you have difficulty preparing food and eating? No   Do you have difficulty bathing yourself, getting dressed or grooming yourself? No   Do you have difficulty using the toilet? No   Do you have difficulty moving around from place to place? No   Do you have trouble with steps or getting out of a bed or a chair? No   Current Diet Well Balanced Diet   Dental Exam Up to date   Eye Exam Up to date   Exercise (times per week) 7 times per week   Current Exercises Include Walking   Do you need help using the phone?  No   Are you deaf or do you have serious difficulty hearing?  No   Do you need help to go to places out of walking distance? No   Do you need help shopping? No   Do you need help preparing meals?  No   Do you need help with housework?  No   Do you need help with laundry? No   Do you need help taking your medications? No   Do you need help managing money? No   Do you ever drive or ride in a car without wearing a seat belt? No   Have you felt unusual stress, anger or loneliness in the last month? No   Who do you live with? Spouse   If you need help, do you have trouble finding someone available to you? No   Have you been bothered in the last four weeks by sexual problems? No   Do you have difficulty concentrating, remembering or making decisions? No           Age-appropriate Screening Schedule:  Refer to the list below for future screening recommendations based on patient's age, sex and/or medical conditions. Orders for these recommended tests are listed in the plan section. The patient has been provided with a written plan.    Health  Maintenance List  Health Maintenance   Topic Date Due    COLORECTAL CANCER SCREENING  Never done    ZOSTER VACCINE (1 of 2) Never done    COVID-19 Vaccine (8 - 2024-25 season) 09/01/2024    RSV Vaccine - Adults (1 - 1-dose 75+ series) Never done    DXA SCAN  12/21/2024    ANNUAL WELLNESS VISIT  03/01/2025    LIPID PANEL  03/01/2025    BMI FOLLOWUP  03/01/2025    TDAP/TD VACCINES (2 - Td or Tdap) 08/30/2032    HEPATITIS C SCREENING  Completed    INFLUENZA VACCINE  Completed    Pneumococcal Vaccine 50+  Completed    MAMMOGRAM  Discontinued                                                                                                                                                CMS Preventative Services Quick Reference  Risk Factors Identified During Encounter  Immunizations Discussed/Encouraged: Shingrix  Dental Screening Recommended  Vision Screening Recommended    The above risks/problems have been discussed with the patient.  Pertinent information has been shared with the patient in the After Visit Summary.  An After Visit Summary and PPPS were made available to the patient.    Follow Up:   Next Medicare Wellness visit to be scheduled in 1 year.     Assessment & Plan  Medicare annual wellness visit, subsequent  Colonoscopy: will schedule with Dr. Quinones  Mammogram: history of lumpectomy, following with oncology/breast surg  LDCT: former smoker  DEXA: last 12/2022 with osteopenia, on fosamax since 2023, repeat due.    Immunizations: eligible for Shingrex  Labs: ordered today, on statin  The 10-year ASCVD risk score (Werner DIANA, et al., 2019) is: 25.7%    Values used to calculate the score:      Age: 74 years      Sex: Female      Is Non- : No      Diabetic: No      Tobacco smoker: No      Systolic Blood Pressure: 146 mmHg      Is BP treated: Yes      HDL Cholesterol: 36 mg/dL      Total Cholesterol: 147 mg/dL        Patient was counseled in regards to maintaining a healthy lifestyle, rich  in whole grains, fruits and vegetables. Limit high saturated fats and processed sugars. Maintain an active lifestyle to promote overall health and well being.            Screening for deficiency anemia    Orders:    CBC Auto Differential    Screening for ischemic heart disease    Orders:    Comprehensive Metabolic Panel    Lipid Panel    Elevated random blood glucose level    Orders:    ORDER: Hemoglobin A1c    Adult hypothyroidism    Orders:    TSH    Postmenopausal    Orders:    DEXA Bone Density Axial; Future         Follow Up:   No follow-ups on file.         weight-bearing as tolerated

## 2025-03-05 LAB
ALBUMIN SERPL-MCNC: 4.3 G/DL (ref 3.5–5.2)
ALBUMIN/GLOB SERPL: 1.5 G/DL
ALP SERPL-CCNC: 62 U/L (ref 39–117)
ALT SERPL-CCNC: 27 U/L (ref 1–33)
AST SERPL-CCNC: 28 U/L (ref 1–32)
BASOPHILS # BLD AUTO: 0.06 10*3/MM3 (ref 0–0.2)
BASOPHILS NFR BLD AUTO: 0.8 % (ref 0–1.5)
BILIRUB SERPL-MCNC: 0.8 MG/DL (ref 0–1.2)
BUN SERPL-MCNC: 13 MG/DL (ref 8–23)
BUN/CREAT SERPL: 20.6 (ref 7–25)
CALCIUM SERPL-MCNC: 9.9 MG/DL (ref 8.6–10.5)
CHLORIDE SERPL-SCNC: 105 MMOL/L (ref 98–107)
CHOLEST SERPL-MCNC: 165 MG/DL (ref 0–200)
CO2 SERPL-SCNC: 27 MMOL/L (ref 22–29)
CREAT SERPL-MCNC: 0.63 MG/DL (ref 0.57–1)
EGFRCR SERPLBLD CKD-EPI 2021: 92.6 ML/MIN/1.73
EOSINOPHIL # BLD AUTO: 0.11 10*3/MM3 (ref 0–0.4)
EOSINOPHIL NFR BLD AUTO: 1.6 % (ref 0.3–6.2)
ERYTHROCYTE [DISTWIDTH] IN BLOOD BY AUTOMATED COUNT: 11.8 % (ref 12.3–15.4)
GLOBULIN SER CALC-MCNC: 2.8 GM/DL
GLUCOSE SERPL-MCNC: 100 MG/DL (ref 65–99)
HBA1C MFR BLD: 5.7 % (ref 4.8–5.6)
HCT VFR BLD AUTO: 42 % (ref 34–46.6)
HDLC SERPL-MCNC: 40 MG/DL (ref 40–60)
HGB BLD-MCNC: 14.3 G/DL (ref 12–15.9)
IMM GRANULOCYTES # BLD AUTO: 0.02 10*3/MM3 (ref 0–0.05)
IMM GRANULOCYTES NFR BLD AUTO: 0.3 % (ref 0–0.5)
LDLC SERPL CALC-MCNC: 70 MG/DL (ref 0–100)
LYMPHOCYTES # BLD AUTO: 1.92 10*3/MM3 (ref 0.7–3.1)
LYMPHOCYTES NFR BLD AUTO: 27.2 % (ref 19.6–45.3)
MCH RBC QN AUTO: 32.6 PG (ref 26.6–33)
MCHC RBC AUTO-ENTMCNC: 34 G/DL (ref 31.5–35.7)
MCV RBC AUTO: 95.7 FL (ref 79–97)
MONOCYTES # BLD AUTO: 0.6 10*3/MM3 (ref 0.1–0.9)
MONOCYTES NFR BLD AUTO: 8.5 % (ref 5–12)
NEUTROPHILS # BLD AUTO: 4.35 10*3/MM3 (ref 1.7–7)
NEUTROPHILS NFR BLD AUTO: 61.6 % (ref 42.7–76)
NRBC BLD AUTO-RTO: 0 /100 WBC (ref 0–0.2)
PLATELET # BLD AUTO: 198 10*3/MM3 (ref 140–450)
POTASSIUM SERPL-SCNC: 4.4 MMOL/L (ref 3.5–5.2)
PROT SERPL-MCNC: 7.1 G/DL (ref 6–8.5)
RBC # BLD AUTO: 4.39 10*6/MM3 (ref 3.77–5.28)
SODIUM SERPL-SCNC: 141 MMOL/L (ref 136–145)
TRIGL SERPL-MCNC: 345 MG/DL (ref 0–150)
TSH SERPL DL<=0.005 MIU/L-ACNC: 5.34 UIU/ML (ref 0.27–4.2)
VLDLC SERPL CALC-MCNC: 55 MG/DL (ref 5–40)
WBC # BLD AUTO: 7.06 10*3/MM3 (ref 3.4–10.8)

## 2025-03-06 ENCOUNTER — TELEPHONE (OUTPATIENT)
Dept: FAMILY MEDICINE CLINIC | Facility: CLINIC | Age: 75
End: 2025-03-06
Payer: MEDICARE

## 2025-03-06 DIAGNOSIS — E03.9 ADULT HYPOTHYROIDISM: ICD-10-CM

## 2025-03-06 RX ORDER — LEVOTHYROXINE SODIUM 112 UG/1
112 TABLET ORAL EVERY MORNING
Qty: 90 TABLET | Refills: 0 | Status: SHIPPED | OUTPATIENT
Start: 2025-03-06

## 2025-03-06 NOTE — TELEPHONE ENCOUNTER
Caller: Jessi Ruby    Relationship: Self    Best call back number:[phone]     What was the call regarding: PATIENT STATED SHE HAD HER RSV AND FLU VACCINATIONS PREFORMED ON 10- AT [facility].

## 2025-03-06 NOTE — TELEPHONE ENCOUNTER
I will increase dose to 112mcg daily and will need recheck of TSH in 6 weeks with lab appt. Orders/meds have been placed. I will send formal result note via [employer].

## 2025-03-06 NOTE — TELEPHONE ENCOUNTER
Caller: Jessi Ruby    Relationship: Self    Best call back number: [phone]     Which medication are you concerned about: levothyroxine (SYNTHROID, LEVOTHROID) 100 MCG tablet     Who prescribed you this medication: DR MAHER    What are your concerns: PATIENT STATED SHE HAD LABS PREFORMED ON 03-.    PATIENT STATED DR MAHER WAS TO REVIEW THE RESULTS AND ADVISE THE PATIENT IFG HER levothyroxine (SYNTHROID, LEVOTHROID) 100 MCG tablet  DOSE WAS TO BE CHANGED OR NOT.    PATIENT IS REQUESTING TO KNOW IF THIS WILL BE CHANGED.    PATIENT STATED SHE CANNOT TAKE HER CURRENT SUPPLY OF THIS MEDICATION AS THERE HAS SALINA A RECALL OF  THE MEDICATION.    PATIENT STATED SHE HAS NOT SALINA ABLE TO TAKE THIS MEDICATION SINCE 03-    PLEASE CONTACT PATIENT TO ADVISE.

## 2025-03-07 DIAGNOSIS — E03.9 ADULT HYPOTHYROIDISM: ICD-10-CM

## 2025-03-11 DIAGNOSIS — N95.1 VASOMOTOR SYMPTOMS DUE TO MENOPAUSE: ICD-10-CM

## 2025-03-11 DIAGNOSIS — N32.81 OVERACTIVE BLADDER: ICD-10-CM

## 2025-03-11 RX ORDER — OXYBUTYNIN CHLORIDE 15 MG/1
15 TABLET, EXTENDED RELEASE ORAL DAILY
Qty: 90 TABLET | Refills: 1 | Status: SHIPPED | OUTPATIENT
Start: 2025-03-11

## 2025-03-13 ENCOUNTER — HOSPITAL ENCOUNTER (OUTPATIENT)
Dept: BONE DENSITY | Facility: HOSPITAL | Age: 75
Discharge: HOME | End: 2025-03-13
Admitting: STUDENT IN AN ORGANIZED HEALTH CARE EDUCATION/TRAINING PROGRAM
Payer: MEDICARE

## 2025-03-13 DIAGNOSIS — Z78.0 POSTMENOPAUSAL: ICD-10-CM

## 2025-03-13 PROCEDURE — 77080 DXA BONE DENSITY AXIAL: CPT

## 2025-03-26 ENCOUNTER — HOSPITAL ENCOUNTER (OUTPATIENT)
Dept: MAMMOGRAPHY | Facility: HOSPITAL | Age: 75
Discharge: HOME | End: 2025-03-26
Admitting: INTERNAL MEDICINE
Payer: MEDICARE

## 2025-03-26 DIAGNOSIS — Z12.31 VISIT FOR SCREENING MAMMOGRAM: ICD-10-CM

## 2025-03-26 PROCEDURE — 77063 BREAST TOMOSYNTHESIS BI: CPT

## 2025-03-26 PROCEDURE — 77067 SCR MAMMO BI INCL CAD: CPT

## 2025-04-11 LAB — TSH SERPL DL<=0.005 MIU/L-ACNC: 2.08 UIU/ML (ref 0.27–4.2)

## 2025-04-18 ENCOUNTER — OFFICE VISIT (OUTPATIENT)
Dept: SURGERY | Facility: CLINIC | Age: 75
End: 2025-04-18
Payer: MEDICARE

## 2025-04-18 VITALS
WEIGHT: 192 LBS | OXYGEN SATURATION: 98 % | HEIGHT: 63 IN | HEART RATE: 95 BPM | BODY MASS INDEX: 34.02 KG/M2 | SYSTOLIC BLOOD PRESSURE: 140 MMHG | DIASTOLIC BLOOD PRESSURE: 70 MMHG

## 2025-04-18 DIAGNOSIS — Z91.89 AT HIGH RISK FOR BREAST CANCER: Primary | ICD-10-CM

## 2025-04-18 DIAGNOSIS — N64.89 OTHER SPECIFIED DISORDERS OF BREAST: ICD-10-CM

## 2025-04-18 NOTE — PROGRESS NOTES
Assessment/Plan:     74 y.o. female with:    (1) High risk screening for breast cancer:  - Deshaun Vaz lifetime breast cancer risk 14.2%. Karen Model lifetime risk 9.5%.  - Bilateral Breast MRI 9/16/2024 BI-RADS 2.  Screening mammogram 3/26/2025 BI-RADS 2.  Annual MRI due 09/2025.  - Currently on Tamoxifen. Following with Dr. Avalos.    - Follow up in one year.    (2) History of right breast radial scar and ALH:  - Status post right breast wire localized excisional biopsy 7/28/2022.      Chief complaint: management of breast cancer risk    HPI: Ms. Jessi Ruby is seen at the request of No ref. provider found. The patient is a 71year old woman being seen for a new diagnosis of right breast radial scar.    This was initially detected as an imaging abnormality on routine screening. Her work-up is detailed in the breast history section below. She has had regular annual mammogram; she did miss one during COVID. She denies any prior history of abnormal mammograms or breast biopsies. She denies any breast lumps, pain, skin changes, or nipple discharge. She denies any family history of breast or ovarian cancer.     8/10/2022 She presents today for follow-up.  Her pain is controlled.  She is getting back to her daily activities.  She has no concerns or complaints.    3/14/2023 She is here today for a follow-up. She denies any breast concerns, denies any masses or skin changes. She has not had her annual mammogram yet.     3/13/2024 She presents today for a follow-up. She denies any new breast masses, skin changes, or nipple discharge. She is scheduled for her annual mammogram on 3/22/2024. She is tolerating Tamoxifen, taking 10mg every other day. She is considering proceeding with a colonoscopy.     4/18/2025 She presents today for follow up. She is overall doing well. She has no concerns with her breast exam. She is following with oncology. She is on Tamoxifen and tolerating this well. She is having no side effects.         TIMELINE OF WORKUP:  2/8/2022 Bilateral Screening Mammogram:   IMPRESSION/RECOMMENDATION(S):   Indeterminate right breast focal asymmetry with questioned distortion.  BI-RADS Category 0: Incomplete    3/15/2022 Right Breast Diagnostic Mammogram with Right Breast US:  With additional imaging there is partial persistence of the area of focal asymmetry in the posterior one third of the right breast located lateral to the plane of the nipple. The area partially resolves on spot compression 90-degree lateral imaging but appears more dense on spot compression CC imaging. There are microcalcifications seen in the region that have become more coarse when compared to prior examinations and have not increased in number or distribution when compared to prior examinations.    ULTRASOUND:   Targeted sonographic evaluation of the right breast was performed through the upper outer quadrant. At the 9 o'clock position on the order of 2 cm from the nipple there is a 0.9 x 0.4 x 0.3 cm ill-defined hypoechoic region surrounded by an area of increased echogenicity. No other abnormality is appreciated.  IMPRESSION:  1. There is a partially persistent area of asymmetry/density in the posterior one third of the right breast located lateral to the plane of the nipple. No sonographic correlate is appreciated. Correlation with a tomosynthesis guided right breast biopsy is recommended.  2. There is a 0.9 cm heterogenous ill-defined hypoechoic and hyperechoic region/lesion in the right breast at the 9 o'clock position on the order of 2 cm from the nipple. Correlation with an ultrasound-guided right breast biopsy is recommended.  BI-RADS CATEGORY 4: Suspicious abnormality or suspicious finding. Biopsy should be considered.    4/19/2022 Right Breast US Guided and Stereotactic Guided Biopsy:   The lesion at the 9-o'clock position in the posterior one-third was visualized. Multiple tissue specimens were obtained. A barbell shaped metallic  clip was placed to franko the site.   Preliminary sonography of the right breast was performed. The lesion at the 9-o'clock position on the order of 2 cm from the nipple was visualized. Multiple tissue specimens were obtained. A tri bell-shaped metallic clip was placed to franko the site.   Postbiopsy digital CC and 90 unilateral images were obtained and demonstrate the barbell-shaped and tri bell-shaped metallic clips at the respective biopsy sites in the right breast. No evidence for clip migration or for hematoma is appreciated.  The pathology result from the 9-o'clock posterior one-third Tomosynthesis biopsy has returned as a radial scar.   The pathology result from the 9-o'clock, 2 cm from nipple ultrasound guided biopsy has returned as benign hyalinized breast stroma and ducts.   Both are concordant with imaging findings.     4/19/2022 Pathology:   Final Diagnosis   1. Right Breast, 9 o'clock, Stereotatic Biopsies for Asymmetry:               A. Focal changes suggesting edge of benign radial scar.               B. No atypical hyperplasia, in situ nor invasive carcinoma identified.  2. Right Breast, 9 o'clock, 2 cm from Nipple, Stereotactic Biopsies for Asymmetry:               A. Benign hyalinized breast stroma and ducts.               B. No atypical hyperplasia, in situ nor invasive carcinoma identified.     7/28/2022 Right breast wire localized excisional biopsy:  Final Diagnosis   1. Breast, Right, Lumpectomy:  Breast tissue with                 A. Atypical lobular hyperplasia.               B. Tri-bell clip identified.                 C. Columnar cell change.               D. Biopsy site changes.               E. Microcalcifications and non-neoplastic tissue.               F.  Fibrous nodule.               G. Simple cyst.               H. Area consistent with portion of radial scar.               I.  Margins free of in-situ and invasive disease.               J. Clustered apocrine cysts  2. Breast, Right,  Additional Superior Margin, Excision:  Breast tissue with                A. Margins free of in-situ and invasive disease.               B. Microcalcifications and non-neoplastic tissue.               C. Atypical lobular hyperplasia.     3/21/2023 Bilateral Screening Mammogram Tomosynthesis with CAD:  FINDINGS:   Scattered fibroglandular densities are seen throughout both breasts. There is postsurgical change of the right breast. In the anterior one-third of the left breast located slightly lateral to the plane of the nipple there is an area of focal asymmetry. I see no new or dominant masses, areas of architectural distortion or skin thickening. There is no evidence for axillary lymphadenopathy or nipple retraction.  IMPRESSION:  1. There is an area of focal asymmetry in the anterior one-third of the left breast located slightly latera to the plane of the nipple. Further evaluation with spot compression CC mammographic and tomosynthesis images and possible targeted left breast sonography is recommended.  2. There are no findings suspicious for malignancy in the right breast. Postsurgical change of the right breast is noted.  BI-RADS Category 0: Incomplete - Needs additional imaging evaluation.    4/18/2023 Left Diagnostic Mammogram Tomosynthesis with CAD:  FINDINGS:  MAMMOGRAM:   Left CC spot compression and lateral 2-D and Tomosynthesis views were obtained.    There are scattered areas of fibroglandular density.   The previously noted asymmetry in the anterior left breast effaces with spot compression with an appearance of normal breast parenchyma that is not significantly changed over multiple prior mammograms dating back to 2017. No correlate is identified on the lateral view. Findings are consistent with superimposition of normal breast parenchyma.   IMPRESSION:  No mammographic evidence of malignancy in the left breast. Recommend annual screening mammogram in March 2024.   BI-RADS Category 1:  Negative    9/12/2023 Bilateral Breast MRI:  FINDINGS:   There is scattered fibroglandular tissue. There is minimal background parenchymal enhancement.  RIGHT BREAST:    Benign-appearing postsurgical changes are identified in the right breast. No suspicious enhancing mass or area of non-mass enhancement is identified. The visualized axilla is within normal limits.  LEFT BREAST:    No suspicious enhancing mass or area of non-mass enhancement is identified. The visualized axilla is within normal limits.  EXTRAMAMMARY FINDINGS:  There are no pathologically enlarged internal mammary chain lymph nodes on either side.     IMPRESSION AND RECOMMENDATION:  No MRI evidence of malignancy in either breast. Recommend annual screening mammogram in March 2024.  BI-RADS Category 2: Benign    3/22/2024 bilateral screening mammogram:  Impression:  There is no evidence for malignancy or significant change in either breast.  Right breast postsurgical changes are appreciated.  Routine follow-up mammography is recommended.  BI-RADS Category 2.    9/16/2024 bilateral breast MRI:  Impression:  No MRI evidence of malignancy in either breast.  Recommend annual screening mammogram March 2025.  BI-RADS Category 2.    3/26/2025 bilateral screening mammogram:  Impression:  No Change from 3/22/2024 3/21/2023.  Recommend Annual Screening Mammogram in 1 Year.  BI-RADS Category 2.      MEDICAL HISTORY:   Gynecologic History:   G:3. P:3 AB:0  Age at first childbirth: 27  Lactation/How long: x3  Age at menarche: 12  Age at menopause: 50s  Total years of oral contraceptive use: Previously  Total years of hormone replacement therapy: None    Past Medical History:   Past Medical History:   Diagnosis Date    Female stress incontinence     Hyperlipidemia     Hypertension     Hyperthyroidism     Medicare annual wellness visit, subsequent 02/28/2023    Radial scar of right breast       Past Surgical History:    Past Surgical History:   Procedure Laterality  Date    BREAST BIOPSY Right 2022    BREAST LUMPECTOMY Right 2022    Procedure: right breast wire localized excisional biopsy of radial scar (9:00, 2 cm from the nipple, tri-bell clip);  Surgeon: Sera Quinones MD;  Location: [facility] OR [facility];  Service: General;  Laterality: Right;    D & C HYSTEROSCOPY ENDOMETRIAL ABLATION      KNEE ARTHROSCOPY Bilateral     10/2000, 10/2002    LASER ABLATION      TOTAL KNEE ARTHROPLASTY Right 2012    TOTAL KNEE ARTHROPLASTY Left 2012    WOUND CLOSURE Right 2017    rt knee    WRIST FRACTURE SURGERY  10/10/2024      Family History:    Family History   Problem Relation Age of Onset    Alzheimer's disease Mother     Cancer Father         prostate    Drug abuse Daughter     Breast cancer Neg Hx     Ovarian cancer Neg Hx     Malig Hyperthermia Neg Hx        Social History:   Social History     Socioeconomic History    Marital status:    Tobacco Use    Smoking status: Former     Current packs/day: 0.00     Average packs/day: 0.3 packs/day for 51.0 years (12.8 ttl pk-yrs)     Types: Cigarettes     Start date:      Quit date:      Years since quittin.2     Passive exposure: Current    Smokeless tobacco: Never    Tobacco comments:     SOCIAL WITH WINE     -off and on smoking for 40 years    Vaping Use    Vaping status: Never Used   Substance and Sexual Activity    Alcohol use: Yes     Alcohol/week: 2.0 - 4.0 standard drinks of alcohol     Types: 2 - 4 Glasses of wine per week     Comment: SOCIAL    Drug use: Never    Sexual activity: Yes     Partners: Male      Allergies:   Allergies   Allergen Reactions    Penicillins Anaphylaxis     Beta lactam allergy details  Antibiotic reaction: (!) shortness of breath, swollen tongue  Age at reaction: child  Dose to reaction time: unknown  Reason for antibiotic: unknown  Epinephrine required for reaction?: unknown  Tolerated antibiotics: unknown        Morphine Nausea And Vomiting     Medications:      Current Outpatient Medications:     alendronate (FOSAMAX) 70 MG tablet, TAKE 1 TABLET BY MOUTH ONE TIME PER WEEK, Disp: 12 tablet, Rfl: 3    atorvastatin (LIPITOR) 40 MG tablet, TAKE 1 TABLET BY MOUTH EVERY DAY AT NIGHT, Disp: 90 tablet, Rfl: 3    CALCIUM PO, Take 1,000 mg by mouth. Patient takes 2 a day, Disp: , Rfl:     levothyroxine (SYNTHROID, LEVOTHROID) 112 MCG tablet, Take 1 tablet by mouth Every Morning., Disp: 90 tablet, Rfl: 0    losartan (COZAAR) 100 MG tablet, TAKE 1 TABLET BY MOUTH EVERY DAY, Disp: 90 tablet, Rfl: 3    oxybutynin XL (DITROPAN XL) 15 MG 24 hr tablet, TAKE 1 TABLET BY MOUTH EVERY DAY, Disp: 90 tablet, Rfl: 1    tamoxifen (NOLVADEX) 10 MG tablet, Take 0.5 tablets by mouth Daily., Disp: 45 tablet, Rfl: 3    VITAMIN D, CHOLECALCIFEROL, PO, Take 2,000 Units by mouth Daily., Disp: , Rfl:      Labs:    Labs from 3/4/2025 reviewed by me.    ROS:   Constitutional: Negative for fevers or chills  HENT: Negative for hearing loss or runny nose  Eyes: Negative for vision changes or scleral icterus  Respiratory: Negative for cough or shortness of breath  Cardiovascular: Negative for chest pain or heart palpitations  Gastrointestinal: Negative for abdominal pain, nausea, vomiting, constipation, melena, or hematochezia  Genitourinary: Negative for hematuria or dysuria  Musculoskeletal: Negative for joint swelling or gait instability  Neurologic: Negative for tremors or seizures  All other systems reviewed and negative    PHYSICAL EXAM:   Vitals:    04/18/25 1136   BP: 140/70   Pulse: 95   SpO2: 98%     Constitutional: Well-developed, well-nourished, no acute distress  Eyes: Conjunctiva normal, sclera nonicteric  ENMT: Hearing grossly normal, oral mucosa moist  Neck: Supple, trachea midline  Respiratory: Breathing comfortably, normal inspiratory effort   Cardiovascular: Regular rate, no peripheral edema, no jugular venous distention  Breast: symmetric  Right: No visible abnormalities on inspection  while seated, with arms raised, or hands on hips. No masses, skin changes, or nipple abnormalities.  Right breast periareolar incision healed.  Left: No visible abnormalities on inspection while seated, with arms raised, or hands on hips. No masses, skin changes, or nipple abnormalities.   No clinical chest wall involvement.  Lymphatics (palpable nodes): No cervical, supraclavicular, or axillary lymphadenopathy  Skin: Warm, dry, no rash on visualized skin surfaces  Musculoskeletal: Symmetric strength, normal gait  Psychiatric: Alert and oriented ×3, normal affect         Amina Ugalde PA-C    [facility] - General Surgery   [address], [address]  [place] [address]    [address], [address]  [place] [address]    Office: [phone]  Fax: [phone]

## 2025-05-05 ENCOUNTER — TELEPHONE (OUTPATIENT)
Dept: ONCOLOGY | Facility: CLINIC | Age: 75
End: 2025-05-05
Payer: MEDICARE

## 2025-05-05 NOTE — TELEPHONE ENCOUNTER
Caller: Juancarlos Ruby    Relationship to patient: Self    Best call back number: [phone]    Chief complaint: NOT ABLE TO MAKE THIS DAY DUE TO GRADUATION , JUANCARLOS ASK TO PLEASE CALL HER TO WORK OUT BEST DAY TO SCHEDULE HAS A LOT OF THINGS TRYING TO WORK AROUND.     Type of visit: VITALS ONLY AND FOLLOW UP 1        If rescheduling, when is the original appointment: 05/22

## 2025-06-02 ENCOUNTER — OFFICE VISIT (OUTPATIENT)
Dept: ONCOLOGY | Facility: CLINIC | Age: 75
End: 2025-06-02
Payer: MEDICARE

## 2025-06-02 VITALS
RESPIRATION RATE: 17 BRPM | WEIGHT: 192.2 LBS | DIASTOLIC BLOOD PRESSURE: 81 MMHG | OXYGEN SATURATION: 95 % | HEIGHT: 63 IN | TEMPERATURE: 98.7 F | SYSTOLIC BLOOD PRESSURE: 148 MMHG | BODY MASS INDEX: 34.05 KG/M2 | HEART RATE: 87 BPM

## 2025-06-02 DIAGNOSIS — N60.99 ATYPICAL DUCTAL HYPERPLASIA OF BREAST: ICD-10-CM

## 2025-06-02 DIAGNOSIS — Z79.810 LONG-TERM CURRENT USE OF TAMOXIFEN: Primary | ICD-10-CM

## 2025-06-02 DIAGNOSIS — N64.89 RADIAL SCAR OF RIGHT BREAST: ICD-10-CM

## 2025-06-02 DIAGNOSIS — E03.9 ADULT HYPOTHYROIDISM: ICD-10-CM

## 2025-06-02 PROCEDURE — 3079F DIAST BP 80-89 MM HG: CPT | Performed by: NURSE PRACTITIONER

## 2025-06-02 PROCEDURE — 1126F AMNT PAIN NOTED NONE PRSNT: CPT | Performed by: NURSE PRACTITIONER

## 2025-06-02 PROCEDURE — 99214 OFFICE O/P EST MOD 30 MIN: CPT | Performed by: NURSE PRACTITIONER

## 2025-06-02 PROCEDURE — 3077F SYST BP >= 140 MM HG: CPT | Performed by: NURSE PRACTITIONER

## 2025-06-02 RX ORDER — LEVOTHYROXINE SODIUM 112 UG/1
112 TABLET ORAL EVERY MORNING
Qty: 90 TABLET | Refills: 0 | Status: SHIPPED | OUTPATIENT
Start: 2025-06-02

## 2025-06-02 RX ORDER — TAMOXIFEN CITRATE 10 MG/1
5 TABLET ORAL DAILY
Qty: 45 TABLET | Refills: 3 | Status: SHIPPED | OUTPATIENT
Start: 2025-06-02

## 2025-06-02 NOTE — PROGRESS NOTES
Subjective   Jessi Ruby is a 74 y.o. female.  Referred by Cheri Husain for right breast atypical lobular hyperplasia    History of Present Illness   Ms. Ruby is a 72-year-old postmenopausal  lady who presented with a screen detected abnormality of the right breast in 2022 2/8/2022-bilateral screening mammogram  Indeterminate right breast focal asymmetry with questioned distortion.  Further evaluation recommended.    3/15/2022-right breast diagnostic mammogram and ultrasound  Partially persistent area of asymmetry/density in the posterior one third of the right breast located lateral to the plane of the nipple.  No sonographic correlate appreciated.  0.9 cm heterogeneous ill-defined hypoechoic and hyperechoic lesion in the right breast at 9:00, 2 cm from the nipple.  Ultrasound-guided right breast biopsy recommended.    4/19/2022  1.right breast 9:00, stereotactic biopsy-focal changes suggestive of benign radial scar.  No atypical hyperplasia, in situ nor invasive carcinoma.  2.right breast 9:00, 2 cm from the nipple-stereotactic biopsies  Benign hyalinized breast stroma and ducts.  No atypical hyperplasia, in situ or invasive carcinoma.    7/28/2022-right breast lumpectomy  1.atypical lobular hyperplasia  Area consistent with portion of radial scar  Margins are free of in situ and invasive carcinoma  2.right breast additional superior margin  Margins free of in situ or invasive carcinoma  Atypical lobular hyperplasia.    She was seen in the surgery clinic on 3/19/2023 and referred for discussing breast reduction.    Tyrer-Cuzick lifetime risk noted to be 16%.    Karen model 5-year risk noted to be 4.5% as opposed to average risk of 2.2%.  Lifetime risk noted to be 11.3% as opposed to 5.7% average risk.    Ms. Bermudez does have diagnosis of osteoporosis for which she is on Fosamax.  She also has stress incontinence.    Other comorbidities include hypertension, hypothyroidism.    3/21/2023-bilateral  screening mammogram-area of focal asymmetry in the anterior one third of the left breast slightly lateral to the plane of the nipple.  Further evaluation recommended.  No suspicious findings in the right breast.    DEXA 12/21/2022 shows osteopenia with a T score of -2.1.    Interval History  Jessi returns today for 6-month follow-up.  She continues on tamoxifen 10 mg every other day.  She does have some degree of hot flashes which are tolerable at this point.  She had follow-up mammogram in March with benign findings.  Annual MRI bilateral breast is scheduled for September.     She denies any concerns with her breasts today.  Denies other new concerns healthwise.    The following portions of the patient's history were reviewed and updated as appropriate: allergies, current medications, past family history, past medical history, past social history, past surgical history and problem list.    Past Medical History:   Diagnosis Date    Female stress incontinence     Hyperlipidemia     Hypertension     Hyperthyroidism     Hypothyroidism     Medicare annual wellness visit, subsequent 02/28/2023    Radial scar of right breast         Past Surgical History:   Procedure Laterality Date    BREAST BIOPSY Right 04/19/2022    BREAST LUMPECTOMY Right 07/28/2022    Procedure: right breast wire localized excisional biopsy of radial scar (9:00, 2 cm from the nipple, tri-bell clip);  Surgeon: Sera Quinones MD;  Location: [facility] OR [facility];  Service: General;  Laterality: Right;    D & C HYSTEROSCOPY ENDOMETRIAL ABLATION      KNEE ARTHROSCOPY Bilateral     10/2000, 10/2002    LASER ABLATION      TOTAL KNEE ARTHROPLASTY Right 05/14/2012    TOTAL KNEE ARTHROPLASTY Left 12/17/2012    WOUND CLOSURE Right 06/27/2017    rt knee    WRIST FRACTURE SURGERY  10/10/2024        Family History   Problem Relation Age of Onset    Alzheimer's disease Mother     Cancer Father         prostate    Drug abuse Daughter     Breast cancer Neg Hx     Ovarian  cancer Neg Hx     Malig Hyperthermia Neg Hx    She has 5 sisters and 2 brothers.  Her father had prostate cancer.  No other family members with any malignancies.    Social History     Socioeconomic History    Marital status:      Spouse name: Tripp   Tobacco Use    Smoking status: Former     Current packs/day: 0.00     Average packs/day: 0.3 packs/day for 51.0 years (12.8 ttl pk-yrs)     Types: Cigarettes     Start date:      Quit date:      Years since quittin.4     Passive exposure: Current    Smokeless tobacco: Never    Tobacco comments:     SOCIAL WITH WINE     -off and on smoking for 40 years    Vaping Use    Vaping status: Never Used   Substance and Sexual Activity    Alcohol use: Yes     Alcohol/week: 2.0 - 4.0 standard drinks of alcohol     Types: 2 - 4 Glasses of wine per week     Comment: SOCIAL    Drug use: Never    Sexual activity: Yes     Partners: Male        OB History          3    Para   3    Term   3            AB        Living             SAB        IAB        Ectopic        Molar        Multiple        Live Births                   Age at menarche-12  Age at first live childbirth-27   3 para 3  0  Oral contraceptive pill use when she was younger  No use of hormone replacement therapy  Age at menopause-50    Allergies   Allergen Reactions    Penicillins Anaphylaxis     Beta lactam allergy details  Antibiotic reaction: (!) shortness of breath, swollen tongue  Age at reaction: child  Dose to reaction time: unknown  Reason for antibiotic: unknown  Epinephrine required for reaction?: unknown  Tolerated antibiotics: unknown        Morphine Nausea And Vomiting            Review of Systems   Constitutional: Negative.    HENT: Negative.     Eyes: Negative.    Respiratory: Negative.     Cardiovascular: Negative.    Gastrointestinal: Negative.    Endocrine: Negative.    Genitourinary: Negative.  Positive for amenorrhea.   Musculoskeletal: Negative.   "  Allergic/Immunologic: Negative.    Neurological: Negative.    Hematological: Negative.    Psychiatric/Behavioral: Negative.         Review of systems as mentioned HPI otherwise negative    Objective   Blood pressure 148/81, pulse 87, temperature 98.7 °F (37.1 °C), temperature source Oral, resp. rate 17, height 160 cm (62.99\"), weight 87.2 kg (192 lb 3.2 oz), SpO2 95%, not currently breastfeeding.     Physical Exam  Constitutional:       Appearance: Normal appearance.   HENT:      Right Ear: External ear normal.      Left Ear: External ear normal.   Eyes:      Conjunctiva/sclera: Conjunctivae normal.   Cardiovascular:      Rate and Rhythm: Normal rate.   Pulmonary:      Effort: Pulmonary effort is normal.   Abdominal:      General: Abdomen is flat.   Musculoskeletal:         General: Normal range of motion.   Skin:     General: Skin is warm.   Neurological:      General: No focal deficit present.      Mental Status: She is alert and oriented to person, place, and time.   Psychiatric:         Mood and Affect: Mood normal.         Behavior: Behavior normal.         Thought Content: Thought content normal.         Judgment: Judgment normal.     Breasts: right breast appears normal inspection with well-healed scar.  No palpable abnormalities of the right breast.  Left breast appears normal inspection.  No palpable abnormalities of the left breast.        No visits with results within 30 Day(s) from this visit.   Latest known visit with results is:   Orders Only on 03/07/2025   Component Date Value Ref Range Status    TSH 04/11/2025 2.080  0.270 - 4.200 uIU/mL Final        No radiology results for the last 30 days.         Assessment & Plan       *Atypical lobular hyperplasia  Aliciaer-Yumikozick lifetime risk estimated to be 16%, Karen model lifetime risk estimate noted to be 11.3% and 5-year risk 4.5%.  This is increased compared to average woman her age.  I explained to her that there is increased risk of breast cancer " associated with ALH and the risk is anywhere from 2-5 fold.  The breast cancer risk is increased not only in the ipsilateral but also on the contralateral breast.  Given that she is in her 70s her lifetime risk is elevated but not similar to if she was younger.  There would be a benefit from endocrine therapy such as tamoxifen or aromatase inhibitors.  However it would be important to consider the side effects.  Given that she has stress incontinence and osteopenia I would not recommend aromatase inhibitors.  We discussed the side effects of tamoxifen 20 mg including but not limited to hot flashes, mood changes, fatigue, nausea, increased risk of DVT PE, risk of stroke, increased risk of uterine cancer.  We also discussed the 5 mg dosing of tamoxifen for prevention and the fact that it is associated with less side effects.  March 2023-low-dose tamoxifen started  Screening mammogram 3/21/2023 showing benign right breast and focal asymmetry in the left breast recommending diagnostic mammogram of the left breast.  Diagnostic mammogram left breast 4/18/2023 benign.  6/2/2025: Continues on tamoxifen 10 mg every other day, tolerating well.  ROXIE on exam.  Screening mammogram March 2025 benign, bilateral breast MRI September 2025 scheduled.    *Left breast abnormality  4/18/2023-left breast diagnostic mammogram benign  Recent breast MRI from September 2024 benign    *Osteopenia  Recent DEXA from December 2022 suggestive of osteopenia with a T score of -2.1  Continue Fosamax  Continue calcium and vitamin D  Recent fall and fracture of the right wrist.    *Hyperlipidemia-continue atorvastatin    PLAN:   Continue low-dose tamoxifen, 10 mg every other day.  Refilled today.  Screening mammogram March 2025 benign.  Bilateral breast MRI due September 2025, already scheduled.    Follow-up with Dr. Avalos in 6 months.      The patient is on high risk medication that requires close monitoring for toxicity.

## 2025-08-29 DIAGNOSIS — E03.9 ADULT HYPOTHYROIDISM: ICD-10-CM

## 2025-08-29 RX ORDER — LEVOTHYROXINE SODIUM 112 UG/1
112 TABLET ORAL EVERY MORNING
Qty: 90 TABLET | Refills: 0 | Status: SHIPPED | OUTPATIENT
Start: 2025-08-29

## (undated) DEVICE — SUT SILK 2/0 FS BLK 18IN 685G

## (undated) DEVICE — APPL CHLORAPREP HI/LITE 26ML ORNG

## (undated) DEVICE — PK CHST BRST 40

## (undated) DEVICE — GLV SURG BIOGEL LTX PF 6 1/2

## (undated) DEVICE — TBG PENCL TELESCP MEGADYNE SMOKE EVAC 10FT

## (undated) DEVICE — TRAP FLD MINIVAC MEGADYNE 100ML

## (undated) DEVICE — SUT MNCRYL PLS ANTIB UD 4/0 PS2 18IN

## (undated) DEVICE — SUT VIC 3/0 SH 27IN J416H

## (undated) DEVICE — NDL HYPO ECLPS SFTY 22G 1 1/2IN

## (undated) DEVICE — ADHS SKIN SURG TISS VISC PREMIERPRO EXOFIN HI/VISC FAST/DRY